# Patient Record
Sex: MALE | Race: WHITE | NOT HISPANIC OR LATINO | Employment: FULL TIME | URBAN - METROPOLITAN AREA
[De-identification: names, ages, dates, MRNs, and addresses within clinical notes are randomized per-mention and may not be internally consistent; named-entity substitution may affect disease eponyms.]

---

## 2018-07-03 RX ORDER — ALBUTEROL SULFATE 90 UG/1
2 AEROSOL, METERED RESPIRATORY (INHALATION) 4 TIMES DAILY
COMMUNITY
Start: 2014-10-14 | End: 2018-07-05

## 2018-07-03 RX ORDER — METHADONE HYDROCHLORIDE 10 MG/ML
INJECTION, SOLUTION INTRAMUSCULAR; INTRAVENOUS; SUBCUTANEOUS
COMMUNITY
End: 2018-07-05

## 2018-07-03 RX ORDER — FLUTICASONE FUROATE AND VILANTEROL 100; 25 UG/1; UG/1
1 POWDER RESPIRATORY (INHALATION) DAILY
COMMUNITY
Start: 2014-10-14 | End: 2018-07-05

## 2018-07-03 RX ORDER — ALPRAZOLAM 0.25 MG/1
1 TABLET ORAL DAILY PRN
COMMUNITY
Start: 2014-12-22 | End: 2018-07-05

## 2018-07-05 ENCOUNTER — OFFICE VISIT (OUTPATIENT)
Dept: FAMILY MEDICINE CLINIC | Facility: CLINIC | Age: 33
End: 2018-07-05
Payer: COMMERCIAL

## 2018-07-05 VITALS
DIASTOLIC BLOOD PRESSURE: 86 MMHG | RESPIRATION RATE: 18 BRPM | SYSTOLIC BLOOD PRESSURE: 124 MMHG | HEIGHT: 71 IN | HEART RATE: 80 BPM | BODY MASS INDEX: 27.58 KG/M2 | TEMPERATURE: 97.9 F | WEIGHT: 197 LBS

## 2018-07-05 DIAGNOSIS — J30.2 SEASONAL ALLERGIC RHINITIS, UNSPECIFIED TRIGGER: ICD-10-CM

## 2018-07-05 DIAGNOSIS — G47.20 SLEEP DISORDER, CIRCADIAN: Primary | ICD-10-CM

## 2018-07-05 DIAGNOSIS — K13.70 ORAL MUCOSAL LESION: ICD-10-CM

## 2018-07-05 DIAGNOSIS — E78.5 DYSLIPIDEMIA: ICD-10-CM

## 2018-07-05 PROCEDURE — 99203 OFFICE O/P NEW LOW 30 MIN: CPT | Performed by: INTERNAL MEDICINE

## 2018-07-05 RX ORDER — CHLORAL HYDRATE 500 MG
1000 CAPSULE ORAL DAILY
COMMUNITY

## 2018-07-05 RX ORDER — CETIRIZINE HYDROCHLORIDE 10 MG/1
10 TABLET ORAL
COMMUNITY

## 2018-07-05 NOTE — PROGRESS NOTES
Assessment/Plan:     Diagnoses and all orders for this visit:    Sleep disorder, circadian  -     Ambulatory referral to Sleep Medicine; Future    Seasonal allergic rhinitis, unspecified trigger    Dyslipidemia  -     Lipid panel  -     CBC and differential  -     Comprehensive metabolic panel  -     TSH, 3rd generation with Free T4 reflex    Oral mucosal lesion  -     Ambulatory referral to Oral Maxillofacial Surgery; Future    Other orders  -     cetirizine (ZyrTEC) 10 mg tablet; Take 10 mg by mouth daily  -     Omega-3 Fatty Acids (FISH OIL) 1,000 mg; Take 1,000 mg by mouth daily      George Arias was seen and examined in the office today  Please see HPI for details  In terms of his sleep disturbance, part of the problem is environmental and he is looking to move  He does report some symptoms that possibly may point to a component of sleep apnea but is unclear at this time  He may have benefit from a sleep evaluation  There is a noted oral lesion of the mucosa noted  This has been present for a number of years but would like this looked at  I have referred him to FS  He was also given blood work to complete  Otherwise no other changes were made    r  Subjective:      Patient ID: Elisa Walsh is a 35 y o  male  George Arias is a pleasant man that is here today to establish care  Medical history was reviewed and updated  He has noticed a few things he wanted to discuss  First, he reports noticing problems with sleep  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 as well which contributes to his problem  He also lives in an apartment where there are loud sounds surrounding him  Frequently he is awoken but this  But he also reports noticing where when he is trying to go to sleep, there are times where he wakes up somewhat gasping for air or feeling like he is choking  This will awaken him  He reports feeling like he snores abnormally  He denies restless leg symptoms   He does not some fatigue secondary to all this  Because of the lack of sleep, at times he feels palpitations and anxiety over this  He has also tried taking Tylenol PM, using earplugs, as well as an eye mask  He also reports having an oral lesion that has been there for a number of years that he would like looked at  Panic Attack   Associated symptoms include fatigue and headaches  Pertinent negatives include no abdominal pain, arthralgias, chest pain, chills, coughing, fever, myalgias, nausea, numbness, sore throat, vomiting or weakness  The following portions of the patient's history were reviewed and updated as appropriate: allergies, current medications, past family history, past medical history, past social history, past surgical history and problem list     Review of Systems   Constitutional: Positive for fatigue  Negative for chills, fever and unexpected weight change  HENT: Positive for sinus pressure  Negative for dental problem, sinus pain, sore throat, trouble swallowing and voice change  Eyes: Negative for visual disturbance  Respiratory: Positive for choking  Negative for cough, chest tightness, shortness of breath and wheezing  Cardiovascular: Positive for palpitations  Negative for chest pain and leg swelling  Gastrointestinal: Negative for abdominal pain, blood in stool, constipation, diarrhea, nausea and vomiting  Genitourinary: Negative for difficulty urinating and dysuria  Musculoskeletal: Negative for arthralgias, back pain and myalgias  Skin: Negative  Neurological: Positive for headaches  Negative for dizziness, syncope, weakness and numbness  Hematological: Negative for adenopathy  Does not bruise/bleed easily  Psychiatric/Behavioral: Positive for sleep disturbance  Negative for dysphoric mood  The patient is nervous/anxious            Objective:      /86   Pulse 80   Temp 97 9 °F (36 6 °C)   Resp 18   Ht 5' 11" (1 803 m)   Wt 89 4 kg (197 lb)   BMI 27 48 kg/m²          Physical Exam   Constitutional: He is oriented to person, place, and time  He appears well-developed and well-nourished  No distress  HENT:   Head: Normocephalic and atraumatic  Right Ear: External ear normal    Left Ear: External ear normal    Mouth/Throat:       Eyes: Conjunctivae and EOM are normal  Right eye exhibits no discharge  Left eye exhibits no discharge  No scleral icterus  Neck: Normal range of motion  No tracheal deviation present  No thyromegaly present  Cardiovascular: Normal rate, regular rhythm and normal heart sounds  No murmur heard  Pulmonary/Chest: Effort normal and breath sounds normal  No respiratory distress  He has no wheezes  He exhibits no tenderness  Abdominal: Soft  Bowel sounds are normal  There is no tenderness  There is no rebound  Musculoskeletal: Normal range of motion  He exhibits no edema  Lymphadenopathy:     He has no cervical adenopathy  Neurological: He is alert and oriented to person, place, and time  He has normal reflexes  No cranial nerve deficit  Skin: Skin is warm and dry  He is not diaphoretic  No erythema  Psychiatric: He has a normal mood and affect  His speech is normal and behavior is normal  Judgment and thought content normal    Vitals reviewed

## 2018-08-27 ENCOUNTER — TRANSCRIBE ORDERS (OUTPATIENT)
Dept: ADMINISTRATIVE | Facility: HOSPITAL | Age: 33
End: 2018-08-27

## 2018-08-27 DIAGNOSIS — D10.39: Primary | ICD-10-CM

## 2018-08-30 ENCOUNTER — OFFICE VISIT (OUTPATIENT)
Dept: SLEEP CENTER | Facility: CLINIC | Age: 33
End: 2018-08-30
Payer: COMMERCIAL

## 2018-08-30 VITALS
HEIGHT: 71 IN | BODY MASS INDEX: 27.41 KG/M2 | SYSTOLIC BLOOD PRESSURE: 120 MMHG | WEIGHT: 195.8 LBS | DIASTOLIC BLOOD PRESSURE: 78 MMHG | HEART RATE: 80 BPM

## 2018-08-30 DIAGNOSIS — G47.20 SLEEP DISORDER, CIRCADIAN: ICD-10-CM

## 2018-08-30 DIAGNOSIS — J30.2 SEASONAL ALLERGIC RHINITIS, UNSPECIFIED TRIGGER: ICD-10-CM

## 2018-08-30 DIAGNOSIS — K13.70 ORAL MUCOSAL LESION: ICD-10-CM

## 2018-08-30 DIAGNOSIS — F51.12 INSUFFICIENT SLEEP SYNDROME: ICD-10-CM

## 2018-08-30 DIAGNOSIS — F41.9 ANXIETY: ICD-10-CM

## 2018-08-30 DIAGNOSIS — G47.33 OSA (OBSTRUCTIVE SLEEP APNEA): Primary | ICD-10-CM

## 2018-08-30 PROCEDURE — 99204 OFFICE O/P NEW MOD 45 MIN: CPT | Performed by: PSYCHIATRY & NEUROLOGY

## 2018-08-30 NOTE — PATIENT INSTRUCTIONS
1   Schedule all night polysomnogram  2  Titrate nasal CPAP if warranted  3  Return for review of test results  4  Determine course of treatment following testing  5    Review results of magnetic resonance imaging testing when available
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
## 2018-09-15 ENCOUNTER — HOSPITAL ENCOUNTER (OUTPATIENT)
Dept: CT IMAGING | Facility: HOSPITAL | Age: 33
Discharge: HOME | End: 2018-09-15
Attending: DENTIST
Payer: COMMERCIAL

## 2018-09-15 DIAGNOSIS — D10.39: ICD-10-CM

## 2018-09-15 PROCEDURE — 70487 CT MAXILLOFACIAL W/DYE: CPT

## 2018-09-15 RX ADMIN — IOHEXOL 85 ML: 350 INJECTION, SOLUTION INTRAVENOUS at 07:40

## 2018-10-29 ENCOUNTER — TELEPHONE (OUTPATIENT)
Dept: SLEEP CENTER | Facility: CLINIC | Age: 33
End: 2018-10-29

## 2018-10-29 DIAGNOSIS — G47.33 OSA (OBSTRUCTIVE SLEEP APNEA): Primary | ICD-10-CM

## 2018-10-29 NOTE — TELEPHONE ENCOUNTER
Pt's insurance ([facility]) denied authorization for Diagnostic Sleep Study ([id number])  Per insurance, not medically necessary  Ref# [id number]  Provider can do a peer to peer by calling [phone] and following the prompts for peer to peer

## 2018-10-30 ENCOUNTER — TRANSCRIBE ORDERS (OUTPATIENT)
Dept: SLEEP CENTER | Facility: CLINIC | Age: 33
End: 2018-10-30

## 2018-10-30 NOTE — TELEPHONE ENCOUNTER
Called pt and l/m to r/c to schedule Home Study  Informed pt that appts were cx until he calls us back

## 2018-12-06 PROCEDURE — 88160 CYTOPATH SMEAR OTHER SOURCE: CPT | Performed by: PATHOLOGY

## 2018-12-06 PROCEDURE — 88305 TISSUE EXAM BY PATHOLOGIST: CPT | Performed by: PATHOLOGY

## 2018-12-06 PROCEDURE — 88112 CYTOPATH CELL ENHANCE TECH: CPT | Performed by: PATHOLOGY

## 2018-12-07 ENCOUNTER — LAB REQUISITION (OUTPATIENT)
Dept: LAB | Facility: HOSPITAL | Age: 33
End: 2018-12-07
Payer: COMMERCIAL

## 2018-12-07 DIAGNOSIS — R22.0 LOCALIZED SWELLING, MASS, AND LUMP OF HEAD: ICD-10-CM

## 2019-05-06 PROBLEM — M85.88 MASS OF HARD PALATE: Status: ACTIVE | Noted: 2019-05-06

## 2019-10-11 ENCOUNTER — OFFICE VISIT (OUTPATIENT)
Dept: FAMILY MEDICINE CLINIC | Facility: CLINIC | Age: 34
End: 2019-10-11
Payer: COMMERCIAL

## 2019-10-11 VITALS
RESPIRATION RATE: 18 BRPM | DIASTOLIC BLOOD PRESSURE: 70 MMHG | WEIGHT: 202 LBS | TEMPERATURE: 98.2 F | BODY MASS INDEX: 27.36 KG/M2 | HEART RATE: 84 BPM | HEIGHT: 72 IN | SYSTOLIC BLOOD PRESSURE: 114 MMHG | OXYGEN SATURATION: 96 %

## 2019-10-11 DIAGNOSIS — G47.20 SLEEP DISORDER, CIRCADIAN: Primary | ICD-10-CM

## 2019-10-11 DIAGNOSIS — E66.3 OVERWEIGHT (BMI 25.0-29.9): ICD-10-CM

## 2019-10-11 DIAGNOSIS — F41.9 ANXIETY: ICD-10-CM

## 2019-10-11 DIAGNOSIS — E78.5 DYSLIPIDEMIA: ICD-10-CM

## 2019-10-11 PROCEDURE — 99215 OFFICE O/P EST HI 40 MIN: CPT | Performed by: PHYSICIAN ASSISTANT

## 2019-10-11 RX ORDER — TRAZODONE HYDROCHLORIDE 50 MG/1
TABLET ORAL
COMMUNITY
Start: 2019-10-07 | End: 2019-10-15 | Stop reason: SINTOL

## 2019-10-11 RX ORDER — DOXEPIN HYDROCHLORIDE 25 MG/1
CAPSULE ORAL
COMMUNITY
Start: 2019-10-07 | End: 2019-10-15 | Stop reason: SINTOL

## 2019-10-11 NOTE — PROGRESS NOTES
Assessment and Plan:  Patient Instructions   1  Sleep disorder-patient with some insomnia, secondary to anxiety and distraction at home  Would recommend that he 1st try the trazodone at bedtime as necessary  Patient was getting some relief with doxepin however he states he does not want to be on a medication on a daily basis  Thus, we will try trazodone as needed  Recommend follow-up in 2-4 weeks if symptoms are not improving and consider daily medication  2  Anxiety-treatment discussed as above  Will assess labs including thyroid, CBC, CMP  3  Hyperlipidemia-cholesterol labs from this summer were reviewed  Continue with healthy diet and exercise  4  Overweight-continue with diet and exercise efforts  Diagnoses and all orders for this visit:    Sleep disorder, circadian  -     CBC and differential  -     Comprehensive metabolic panel  -     TSH, 3rd generation with Free T4 reflex    Anxiety  -     CBC and differential  -     Comprehensive metabolic panel  -     TSH, 3rd generation with Free T4 reflex    Dyslipidemia    Overweight (BMI 25 0-29  9)    Other orders  -     doxepin (SINEquan) 25 mg capsule  -     traZODone (DESYREL) 50 mg tablet              Subjective:      Patient ID: Tia Whitney is a 29 y o  male  CC:    Chief Complaint   Patient presents with    Anxiety     pt was having anxiety this past week and went to urgent care and they told him to follow up with a PCP  He is here to address this issue and establish a PCP    Insomnia       HPI:    HPI:  This is a 79-year-old gentleman that presents to the office as a new patient  He is here to get established  He was recently seen in the urgent care setting for anxiety and trouble sleeping  He feels that most of his anxiety is related to his difficulty sleeping 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  He has been trying to sleep from 9:00 a m  Until later in the afternoon    He has been having some distraction at home with his neighbors getting new roofs on their house that he has had difficulty sleeping  He has also had some financial difficulties with his wife being injured and unable to work recently  At the urgent care setting he was given doxepin which seemed to help him with sleep however he was using it as needed because he does not want to take something every day  He did not yet tried trazodone which was prescribed either  He does occasionally have symptoms of panic attacks where he will get a sudden sense of anxiety and feel that his heart is racing  He was getting these every day but they seem less frequent recently  The following portions of the patient's history were reviewed and updated as appropriate: allergies, current medications, past family history, past medical history, past social history, past surgical history and problem list       Review of Systems   Constitutional: Negative for chills, fatigue and fever  HENT: Negative for congestion, ear pain and sinus pressure  Eyes: Negative for visual disturbance  Respiratory: Negative for cough, chest tightness and shortness of breath  Cardiovascular: Negative for chest pain and palpitations  Gastrointestinal: Negative for diarrhea, nausea and vomiting  Endocrine: Negative for polyuria  Genitourinary: Negative for dysuria and frequency  Musculoskeletal: Negative for arthralgias and myalgias  Skin: Negative for pallor and rash  Neurological: Negative for dizziness, weakness, light-headedness, numbness and headaches  Psychiatric/Behavioral: Positive for sleep disturbance  Negative for agitation and behavioral problems  The patient is nervous/anxious  All other systems reviewed and are negative          Data to review:       Objective:    Vitals:    10/11/19 1448   BP: 114/70   BP Location: Left arm   Patient Position: Sitting   Cuff Size: Adult   Pulse: 84   Resp: 18   Temp: 98 2 °F (36 8 °C)   TempSrc: Temporal   SpO2: 96%   Weight: 91 6 kg (202 lb)   Height: 6' (1 829 m)        Physical Exam   Constitutional: He is oriented to person, place, and time  He appears well-developed and well-nourished  No distress  HENT:   Head: Normocephalic and atraumatic  Right Ear: External ear normal    Left Ear: External ear normal    Nose: Nose normal    Mouth/Throat: Oropharynx is clear and moist  No oropharyngeal exudate  Eyes: Pupils are equal, round, and reactive to light  Conjunctivae and EOM are normal    Neck: Normal range of motion  Neck supple  No tracheal deviation present  No thyromegaly present  Cardiovascular: Normal rate, regular rhythm and normal heart sounds  Exam reveals no friction rub  No murmur heard  Pulmonary/Chest: Effort normal and breath sounds normal  No respiratory distress  He has no wheezes  He has no rales  Abdominal: Soft  Bowel sounds are normal  He exhibits no distension  There is no tenderness  There is no rebound and no guarding  Musculoskeletal: Normal range of motion  He exhibits no edema or tenderness  Lymphadenopathy:     He has no cervical adenopathy  Neurological: He is alert and oriented to person, place, and time  No cranial nerve deficit  Coordination normal    Skin: Skin is warm and dry  No rash noted  No erythema  Psychiatric: He has a normal mood and affect  His behavior is normal  Thought content normal    Nursing note and vitals reviewed  BMI Counseling: Body mass index is 27 4 kg/m²  The BMI is above normal  Nutrition recommendations include reducing portion sizes

## 2019-10-11 NOTE — PATIENT INSTRUCTIONS
1  Sleep disorder-patient with some insomnia, secondary to anxiety and distraction at home  Would recommend that he 1st try the trazodone at bedtime as necessary  Patient was getting some relief with doxepin however he states he does not want to be on a medication on a daily basis  Thus, we will try trazodone as needed  Recommend follow-up in 2-4 weeks if symptoms are not improving and consider daily medication  2  Anxiety-treatment discussed as above  Will assess labs including thyroid, CBC, CMP  3  Hyperlipidemia-cholesterol labs from this summer were reviewed  Continue with healthy diet and exercise  4  Overweight-continue with diet and exercise efforts

## 2019-10-14 ENCOUNTER — TELEPHONE (OUTPATIENT)
Dept: FAMILY MEDICINE CLINIC | Facility: CLINIC | Age: 34
End: 2019-10-14

## 2019-10-14 DIAGNOSIS — F41.9 ANXIETY: Primary | ICD-10-CM

## 2019-10-14 NOTE — TELEPHONE ENCOUNTER
Patient calling to request to speak to a provider, the medication he was prescribed last week is not working and he thinks he is experiencing side effects from the Trazodone and the Doxepin   Can someone please call him [phone]

## 2019-10-15 RX ORDER — HYDROXYZINE 50 MG/1
50 TABLET, FILM COATED ORAL
Qty: 30 TABLET | Refills: 0 | Status: SHIPPED | OUTPATIENT
Start: 2019-10-15 | End: 2019-10-22 | Stop reason: ALTCHOICE

## 2019-10-15 NOTE — TELEPHONE ENCOUNTER
He may discontinue the trazodone and doxepin  Would recommend trial of hydroxyzine 50 mg at bedtime  I will send a prescription for this to his pharmacy

## 2019-10-15 NOTE — TELEPHONE ENCOUNTER
Alprazolam is a controlled substance with addictive potential   I would not recommended  I would recommend the hydroxyzine  Or we could set up a consult with Psychiatry if he would prefer

## 2019-10-15 NOTE — TELEPHONE ENCOUNTER
Pt states he use to take Alprazolam in the past and it seemed to work for him, pt asking if he can get a script for that sent to [facility]

## 2019-10-22 ENCOUNTER — OFFICE VISIT (OUTPATIENT)
Dept: FAMILY MEDICINE CLINIC | Facility: CLINIC | Age: 34
End: 2019-10-22
Payer: COMMERCIAL

## 2019-10-22 VITALS
HEIGHT: 72 IN | WEIGHT: 200 LBS | SYSTOLIC BLOOD PRESSURE: 114 MMHG | DIASTOLIC BLOOD PRESSURE: 74 MMHG | BODY MASS INDEX: 27.09 KG/M2 | HEART RATE: 76 BPM

## 2019-10-22 DIAGNOSIS — F41.9 ANXIETY: Primary | ICD-10-CM

## 2019-10-22 DIAGNOSIS — J30.2 SEASONAL ALLERGIES: ICD-10-CM

## 2019-10-22 DIAGNOSIS — G47.09 OTHER INSOMNIA: ICD-10-CM

## 2019-10-22 PROCEDURE — 3008F BODY MASS INDEX DOCD: CPT | Performed by: PHYSICIAN ASSISTANT

## 2019-10-22 PROCEDURE — 99214 OFFICE O/P EST MOD 30 MIN: CPT | Performed by: PHYSICIAN ASSISTANT

## 2019-10-22 RX ORDER — BUPROPION HYDROCHLORIDE 150 MG/1
150 TABLET ORAL EVERY MORNING
Qty: 30 TABLET | Refills: 5 | Status: SHIPPED | OUTPATIENT
Start: 2019-10-22 | End: 2019-12-06

## 2019-10-22 RX ORDER — ALPRAZOLAM 0.25 MG/1
0.25 TABLET ORAL
Qty: 30 TABLET | Refills: 0 | Status: SHIPPED | OUTPATIENT
Start: 2019-10-22 | End: 2020-06-13 | Stop reason: ALTCHOICE

## 2019-10-22 NOTE — PATIENT INSTRUCTIONS
1   Anxiety -not at goal   Would recommend something that patient can sake safely daily  He has failed on doxepin, trazodone, hydroxyzine  Will try Wellbutrin  mg daily  Reassess in 4 weeks as necessary  2   Insomnia -persistent -will prescribe   Xanax 0 25 mg at bedtime as necessary  Possible side effects and appropriate use of medication discussed with patient  He was given a prescription for 30 tablets with no refills and advised that this should last over a month and hopefully be enough to get through his episode  If he requires refills consider further evaluation by specialist   3   Allergic rhinitis - stable with Zyrtec as necessary

## 2019-10-22 NOTE — PROGRESS NOTES
Assessment and Plan:  Patient Instructions     1  Anxiety -not at goal   Would recommend something that patient can sake safely daily  He has failed on doxepin, trazodone, hydroxyzine  Will try Wellbutrin  mg daily  Reassess in 4 weeks as necessary  2   Insomnia -persistent -will prescribe   Xanax 0 25 mg at bedtime as necessary  Possible side effects and appropriate use of medication discussed with patient  He was given a prescription for 30 tablets with no refills and advised that this should last over a month and hopefully be enough to get through his episode  If he requires refills consider further evaluation by specialist   3   Allergic rhinitis - stable with Zyrtec as necessary  Diagnoses and all orders for this visit:    Anxiety  -     buPROPion (WELLBUTRIN XL) 150 mg 24 hr tablet; Take 1 tablet (150 mg total) by mouth every morning  -     ALPRAZolam (XANAX) 0 25 mg tablet; Take 1 tablet (0 25 mg total) by mouth daily at bedtime as needed for anxiety    Other insomnia  -     buPROPion (WELLBUTRIN XL) 150 mg 24 hr tablet; Take 1 tablet (150 mg total) by mouth every morning  -     ALPRAZolam (XANAX) 0 25 mg tablet; Take 1 tablet (0 25 mg total) by mouth daily at bedtime as needed for anxiety    Seasonal allergies              Subjective:      Patient ID: Rani Au is a 29 y o  male  CC:    Chief Complaint   Patient presents with    Stress    Anxiety    Sleeping Problem       HPI:     HPI:  This is a 72-year-old gentleman that presents to the office for follow-up of anxiety and insomnia  He was initially seen in the urgent care and started on doxepin and trazodone  He had some side effect of priapism after using the trazodone and grew concerned  He has been under lot of stress between 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  He has had to drop class because of difficulty with the workload and keeping up    He was also given hydroxyzine to help him sleep but has been having difficulty despite using that  He is sleeping during the daytime and working primarily at nighttime  He has had difficulty sleeping during the daytime while his neighbors roofs are being fixed and there is constant hammering  The following portions of the patient's history were reviewed and updated as appropriate: allergies, current medications, past family history, past medical history, past social history, past surgical history and problem list       Review of Systems   Constitutional: Negative for fever  HENT: Negative for congestion  Respiratory: Negative for cough, chest tightness and shortness of breath  Cardiovascular: Negative for chest pain  Musculoskeletal: Negative for arthralgias  Psychiatric/Behavioral: Positive for sleep disturbance  The patient is nervous/anxious  Data to review:       Objective:    Vitals:    10/22/19 0807   BP: 114/74   Pulse: 76   Weight: 90 7 kg (200 lb)   Height: 6' (1 829 m)        Physical Exam   Constitutional: He is oriented to person, place, and time  He appears well-developed and well-nourished  HENT:   Head: Normocephalic  Cardiovascular: Normal rate and regular rhythm  Pulmonary/Chest: Effort normal and breath sounds normal  No respiratory distress  Abdominal: Soft  Musculoskeletal: Normal range of motion  Neurological: He is alert and oriented to person, place, and time  Psychiatric: He has a normal mood and affect

## 2019-11-10 DIAGNOSIS — F41.9 ANXIETY: ICD-10-CM

## 2019-11-11 RX ORDER — HYDROXYZINE 50 MG/1
50 TABLET, FILM COATED ORAL
Qty: 30 TABLET | Refills: 0 | Status: SHIPPED | OUTPATIENT
Start: 2019-11-11 | End: 2020-06-13 | Stop reason: ALTCHOICE

## 2019-12-06 ENCOUNTER — OFFICE VISIT (OUTPATIENT)
Dept: FAMILY MEDICINE CLINIC | Facility: CLINIC | Age: 34
End: 2019-12-06
Payer: COMMERCIAL

## 2019-12-06 VITALS
DIASTOLIC BLOOD PRESSURE: 70 MMHG | WEIGHT: 201 LBS | HEART RATE: 76 BPM | HEIGHT: 72 IN | SYSTOLIC BLOOD PRESSURE: 110 MMHG | BODY MASS INDEX: 27.22 KG/M2

## 2019-12-06 DIAGNOSIS — F41.9 ANXIETY: Primary | ICD-10-CM

## 2019-12-06 PROCEDURE — 1036F TOBACCO NON-USER: CPT | Performed by: FAMILY MEDICINE

## 2019-12-06 PROCEDURE — 3008F BODY MASS INDEX DOCD: CPT | Performed by: FAMILY MEDICINE

## 2019-12-06 PROCEDURE — 99213 OFFICE O/P EST LOW 20 MIN: CPT | Performed by: FAMILY MEDICINE

## 2019-12-06 RX ORDER — CITALOPRAM 10 MG/1
10 TABLET ORAL DAILY
Qty: 30 TABLET | Refills: 3 | Status: SHIPPED | OUTPATIENT
Start: 2019-12-06 | End: 2020-06-13 | Stop reason: ALTCHOICE

## 2019-12-06 NOTE — PROGRESS NOTES
Assessment and Plan:  Follow-up 6 weeks to check progress    Problem List Items Addressed This Visit        Other    Anxiety - Primary      Wellbutrin was discontinued and citalopram was started in its place  I discouraged him from using the Xanax which she wanted to use on a regular basis  Relevant Medications    citalopram (CeleXA) 10 mg tablet                 Diagnoses and all orders for this visit:    Anxiety  -     citalopram (CeleXA) 10 mg tablet; Take 1 tablet (10 mg total) by mouth daily              Subjective:      Patient ID: Linda Mahajan is a 29 y o  male  CC:    Chief Complaint   Patient presents with    Anxiety     follow up anxiety - evaluate medication started at last   - [facility]       HPI:     This is a 20-year-old male who comes in for recheck of his anxiety  He was unable to take the Wellbutrin because it gave him periods of severe depression  He stopped taking the medication and is requesting another medication for the anxiety  He denies any depression  Citalopram will be started  He was wondering if he could just take the Xanax and it was discussed regarding the addictive feature of the drug and therefore citalopram will be used  His blood pressure is 110/70 and his weight is up 1 lb from the previous visit to what 201 lb and his BMI is 27 2  The following portions of the patient's history were reviewed and updated as appropriate: allergies, current medications, past family history, past medical history, past social history, past surgical history and problem list       Review of Systems   Constitutional: Negative  HENT: Negative  Eyes: Negative  Respiratory: Negative  Cardiovascular: Negative  Gastrointestinal: Negative  Endocrine: Negative  Genitourinary: Negative  Musculoskeletal: Negative  Skin: Negative  Allergic/Immunologic: Negative  Neurological: Negative  Hematological: Negative  Psychiatric/Behavioral: Negative  Data to review:       Objective:    Vitals:    12/06/19 0819   BP: 110/70   BP Location: Left arm   Patient Position: Sitting   Cuff Size: Large   Pulse: 76   Weight: 91 2 kg (201 lb)   Height: 6' (1 829 m)        Physical Exam   Constitutional: He is oriented to person, place, and time  He appears well-developed and well-nourished  HENT:   Head: Normocephalic  Right Ear: External ear normal    Left Ear: External ear normal    Mouth/Throat: Oropharynx is clear and moist    Eyes: Pupils are equal, round, and reactive to light  Conjunctivae and EOM are normal    Neck: Normal range of motion  Neck supple  Cardiovascular: Normal rate, regular rhythm and normal heart sounds  Pulmonary/Chest: Effort normal and breath sounds normal    Abdominal: Soft  Bowel sounds are normal    Musculoskeletal: Normal range of motion  Neurological: He is alert and oriented to person, place, and time  Skin: Skin is warm  Psychiatric: He has a normal mood and affect  His behavior is normal  Judgment and thought content normal    Nursing note and vitals reviewed  BMI Counseling: Body mass index is 27 26 kg/m²  The BMI is above normal  Nutrition recommendations include reducing portion sizes, decreasing overall calorie intake, 3-5 servings of fruits/vegetables daily, reducing fast food intake, consuming healthier snacks, decreasing soda and/or juice intake, moderation in carbohydrate intake, increasing intake of lean protein, reducing intake of saturated fat and trans fat and reducing intake of cholesterol  Exercise recommendations include moderate aerobic physical activity for 150 minutes/week

## 2019-12-06 NOTE — ASSESSMENT & PLAN NOTE
Wellbutrin was discontinued and citalopram was started in its place  I discouraged him from using the Xanax which she wanted to use on a regular basis
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
## 2020-06-05 ENCOUNTER — HOSPITAL ENCOUNTER (OUTPATIENT)
Dept: RADIOLOGY | Facility: HOSPITAL | Age: 35
Discharge: HOME | End: 2020-06-05
Payer: COMMERCIAL

## 2020-06-05 DIAGNOSIS — M85.88 MASS OF HARD PALATE: ICD-10-CM

## 2020-06-05 PROCEDURE — 70486 CT MAXILLOFACIAL W/O DYE: CPT

## 2020-06-12 ENCOUNTER — APPOINTMENT (OUTPATIENT)
Dept: LAB | Facility: MEDICAL CENTER | Age: 35
End: 2020-06-12
Payer: COMMERCIAL

## 2020-06-12 DIAGNOSIS — D37.09: ICD-10-CM

## 2020-06-12 LAB
ANION GAP SERPL CALCULATED.3IONS-SCNC: 6 MMOL/L (ref 4–13)
BASOPHILS # BLD AUTO: 0.05 THOUSANDS/ΜL (ref 0–0.1)
BASOPHILS NFR BLD AUTO: 1 % (ref 0–1)
BUN SERPL-MCNC: 13 MG/DL (ref 5–25)
CALCIUM SERPL-MCNC: 8.4 MG/DL (ref 8.3–10.1)
CHLORIDE SERPL-SCNC: 106 MMOL/L (ref 100–108)
CO2 SERPL-SCNC: 25 MMOL/L (ref 21–32)
CREAT SERPL-MCNC: 0.96 MG/DL (ref 0.6–1.3)
EOSINOPHIL # BLD AUTO: 0.22 THOUSAND/ΜL (ref 0–0.61)
EOSINOPHIL NFR BLD AUTO: 4 % (ref 0–6)
ERYTHROCYTE [DISTWIDTH] IN BLOOD BY AUTOMATED COUNT: 11.9 % (ref 11.6–15.1)
GFR SERPL CREATININE-BSD FRML MDRD: 102 ML/MIN/1.73SQ M
GLUCOSE SERPL-MCNC: 94 MG/DL (ref 65–140)
HCT VFR BLD AUTO: 47 % (ref 36.5–49.3)
HGB BLD-MCNC: 16.1 G/DL (ref 12–17)
IMM GRANULOCYTES # BLD AUTO: 0.02 THOUSAND/UL (ref 0–0.2)
IMM GRANULOCYTES NFR BLD AUTO: 0 % (ref 0–2)
LYMPHOCYTES # BLD AUTO: 1.67 THOUSANDS/ΜL (ref 0.6–4.47)
LYMPHOCYTES NFR BLD AUTO: 27 % (ref 14–44)
MCH RBC QN AUTO: 30.7 PG (ref 26.8–34.3)
MCHC RBC AUTO-ENTMCNC: 34.3 G/DL (ref 31.4–37.4)
MCV RBC AUTO: 90 FL (ref 82–98)
MONOCYTES # BLD AUTO: 0.38 THOUSAND/ΜL (ref 0.17–1.22)
MONOCYTES NFR BLD AUTO: 6 % (ref 4–12)
NEUTROPHILS # BLD AUTO: 3.77 THOUSANDS/ΜL (ref 1.85–7.62)
NEUTS SEG NFR BLD AUTO: 62 % (ref 43–75)
NRBC BLD AUTO-RTO: 0 /100 WBCS
PLATELET # BLD AUTO: 230 THOUSANDS/UL (ref 149–390)
PMV BLD AUTO: 11.3 FL (ref 8.9–12.7)
POTASSIUM SERPL-SCNC: 4.1 MMOL/L (ref 3.5–5.3)
RBC # BLD AUTO: 5.24 MILLION/UL (ref 3.88–5.62)
SODIUM SERPL-SCNC: 137 MMOL/L (ref 136–145)
WBC # BLD AUTO: 6.11 THOUSAND/UL (ref 4.31–10.16)

## 2020-06-12 PROCEDURE — 85025 COMPLETE CBC W/AUTO DIFF WBC: CPT

## 2020-06-12 PROCEDURE — 36415 COLL VENOUS BLD VENIPUNCTURE: CPT

## 2020-06-12 PROCEDURE — 80048 BASIC METABOLIC PNL TOTAL CA: CPT

## 2020-06-12 PROCEDURE — U0003 INFECTIOUS AGENT DETECTION BY NUCLEIC ACID (DNA OR RNA); SEVERE ACUTE RESPIRATORY SYNDROME CORONAVIRUS 2 (SARS-COV-2) (CORONAVIRUS DISEASE [COVID-19]), AMPLIFIED PROBE TECHNIQUE, MAKING USE OF HIGH THROUGHPUT TECHNOLOGIES AS DESCRIBED BY CMS-2020-01-R: HCPCS

## 2020-06-13 ENCOUNTER — OFFICE VISIT (OUTPATIENT)
Dept: FAMILY MEDICINE CLINIC | Facility: CLINIC | Age: 35
End: 2020-06-13
Payer: COMMERCIAL

## 2020-06-13 VITALS
BODY MASS INDEX: 28.01 KG/M2 | SYSTOLIC BLOOD PRESSURE: 122 MMHG | TEMPERATURE: 97.7 F | WEIGHT: 206.8 LBS | HEIGHT: 72 IN | HEART RATE: 88 BPM | RESPIRATION RATE: 18 BRPM | DIASTOLIC BLOOD PRESSURE: 88 MMHG

## 2020-06-13 DIAGNOSIS — M54.50 ACUTE MIDLINE LOW BACK PAIN WITHOUT SCIATICA: Primary | ICD-10-CM

## 2020-06-13 LAB — SARS-COV-2 RNA SPEC QL NAA+PROBE: NOT DETECTED

## 2020-06-13 PROCEDURE — 3008F BODY MASS INDEX DOCD: CPT | Performed by: FAMILY MEDICINE

## 2020-06-13 PROCEDURE — 99213 OFFICE O/P EST LOW 20 MIN: CPT | Performed by: FAMILY MEDICINE

## 2020-06-13 PROCEDURE — 1036F TOBACCO NON-USER: CPT | Performed by: FAMILY MEDICINE

## 2020-06-13 RX ORDER — METHOCARBAMOL 750 MG/1
750 TABLET, FILM COATED ORAL 4 TIMES DAILY PRN
Qty: 30 TABLET | Refills: 0 | Status: SHIPPED | OUTPATIENT
Start: 2020-06-13 | End: 2020-07-21

## 2020-06-13 RX ORDER — NAPROXEN 500 MG/1
500 TABLET ORAL EVERY 12 HOURS PRN
Status: ON HOLD | COMMUNITY
Start: 2020-06-10 | End: 2020-07-29 | Stop reason: ALTCHOICE

## 2020-06-13 RX ORDER — METHOCARBAMOL 750 MG/1
750 TABLET, FILM COATED ORAL 4 TIMES DAILY PRN
COMMUNITY
Start: 2020-06-10 | End: 2020-06-13 | Stop reason: SDUPTHER

## 2020-06-13 RX ORDER — DIAZEPAM 5 MG/1
5 TABLET ORAL EVERY 8 HOURS PRN
Status: ON HOLD | COMMUNITY
Start: 2020-06-10 | End: 2020-07-29 | Stop reason: ALTCHOICE

## 2020-06-13 RX ORDER — MOMETASONE FUROATE 50 UG/1
2 SPRAY, METERED NASAL
COMMUNITY
End: 2022-03-21

## 2020-06-17 NOTE — PRE-PROCEDURE INSTRUCTIONS
Pre-Surgery Instructions:   Medication Instructions    cetirizine (ZyrTEC) 10 mg tablet Instructed patient per Anesthesia Guidelines   diazepam (VALIUM) 5 mg tablet Instructed patient per Anesthesia Guidelines   methocarbamol (ROBAXIN) 750 mg tablet Instructed patient per Anesthesia Guidelines   mometasone (NASONEX) 50 mcg/act nasal spray Instructed patient per Anesthesia Guidelines   naproxen (NAPROSYN) 500 mg tablet Instructed patient per Anesthesia Guidelines   Omega-3 Fatty Acids (FISH OIL) 1,000 mg Instructed patient per Anesthesia Guidelines  Preop,medications and showering instructions using an antibacterial soap reviewed

## 2020-06-18 ENCOUNTER — ANESTHESIA EVENT (OUTPATIENT)
Dept: PERIOP | Facility: HOSPITAL | Age: 35
End: 2020-06-18
Payer: COMMERCIAL

## 2020-06-19 ENCOUNTER — ANESTHESIA (OUTPATIENT)
Dept: PERIOP | Facility: HOSPITAL | Age: 35
End: 2020-06-19
Payer: COMMERCIAL

## 2020-06-19 ENCOUNTER — HOSPITAL ENCOUNTER (OUTPATIENT)
Facility: HOSPITAL | Age: 35
Setting detail: OUTPATIENT SURGERY
Discharge: HOME | End: 2020-06-19
Attending: SPECIALIST | Admitting: SPECIALIST
Payer: COMMERCIAL

## 2020-06-19 VITALS
BODY MASS INDEX: 27.9 KG/M2 | SYSTOLIC BLOOD PRESSURE: 131 MMHG | HEIGHT: 72 IN | TEMPERATURE: 97.2 F | DIASTOLIC BLOOD PRESSURE: 75 MMHG | WEIGHT: 206 LBS | OXYGEN SATURATION: 95 % | HEART RATE: 79 BPM | RESPIRATION RATE: 15 BRPM

## 2020-06-19 DIAGNOSIS — D37.09: ICD-10-CM

## 2020-06-19 DIAGNOSIS — M85.88 MASS OF HARD PALATE: Primary | ICD-10-CM

## 2020-06-19 PROCEDURE — 88342 IMHCHEM/IMCYTCHM 1ST ANTB: CPT | Performed by: PATHOLOGY

## 2020-06-19 PROCEDURE — 88305 TISSUE EXAM BY PATHOLOGIST: CPT | Performed by: PATHOLOGY

## 2020-06-19 PROCEDURE — 88311 DECALCIFY TISSUE: CPT | Performed by: PATHOLOGY

## 2020-06-19 PROCEDURE — 88341 IMHCHEM/IMCYTCHM EA ADD ANTB: CPT | Performed by: PATHOLOGY

## 2020-06-19 PROCEDURE — 42106 EXCISION LESION MOUTH ROOF: CPT | Performed by: SPECIALIST

## 2020-06-19 PROCEDURE — 88313 SPECIAL STAINS GROUP 2: CPT

## 2020-06-19 PROCEDURE — 88360 TUMOR IMMUNOHISTOCHEM/MANUAL: CPT

## 2020-06-19 PROCEDURE — 88342 IMHCHEM/IMCYTCHM 1ST ANTB: CPT

## 2020-06-19 PROCEDURE — 88341 IMHCHEM/IMCYTCHM EA ADD ANTB: CPT

## 2020-06-19 RX ORDER — LIDOCAINE HYDROCHLORIDE 10 MG/ML
INJECTION, SOLUTION EPIDURAL; INFILTRATION; INTRACAUDAL; PERINEURAL AS NEEDED
Status: DISCONTINUED | OUTPATIENT
Start: 2020-06-19 | End: 2020-06-19 | Stop reason: SURG

## 2020-06-19 RX ORDER — PROPOFOL 10 MG/ML
INJECTION, EMULSION INTRAVENOUS AS NEEDED
Status: DISCONTINUED | OUTPATIENT
Start: 2020-06-19 | End: 2020-06-19 | Stop reason: SURG

## 2020-06-19 RX ORDER — FENTANYL CITRATE/PF 50 MCG/ML
50 SYRINGE (ML) INJECTION
Status: DISCONTINUED | OUTPATIENT
Start: 2020-06-19 | End: 2020-06-19 | Stop reason: HOSPADM

## 2020-06-19 RX ORDER — MIDAZOLAM HYDROCHLORIDE 2 MG/2ML
INJECTION, SOLUTION INTRAMUSCULAR; INTRAVENOUS AS NEEDED
Status: DISCONTINUED | OUTPATIENT
Start: 2020-06-19 | End: 2020-06-19 | Stop reason: SURG

## 2020-06-19 RX ORDER — ONDANSETRON 2 MG/ML
INJECTION INTRAMUSCULAR; INTRAVENOUS AS NEEDED
Status: DISCONTINUED | OUTPATIENT
Start: 2020-06-19 | End: 2020-06-19 | Stop reason: SURG

## 2020-06-19 RX ORDER — ONDANSETRON 2 MG/ML
4 INJECTION INTRAMUSCULAR; INTRAVENOUS ONCE AS NEEDED
Status: DISCONTINUED | OUTPATIENT
Start: 2020-06-19 | End: 2020-06-19 | Stop reason: HOSPADM

## 2020-06-19 RX ORDER — LIDOCAINE HYDROCHLORIDE AND EPINEPHRINE 10; 10 MG/ML; UG/ML
INJECTION, SOLUTION INFILTRATION; PERINEURAL AS NEEDED
Status: DISCONTINUED | OUTPATIENT
Start: 2020-06-19 | End: 2020-06-19 | Stop reason: HOSPADM

## 2020-06-19 RX ORDER — ACETAMINOPHEN 325 MG/1
TABLET ORAL
Qty: 30 TABLET | Refills: 0
Start: 2020-06-19 | End: 2022-03-21

## 2020-06-19 RX ORDER — FENTANYL CITRATE 50 UG/ML
INJECTION, SOLUTION INTRAMUSCULAR; INTRAVENOUS AS NEEDED
Status: DISCONTINUED | OUTPATIENT
Start: 2020-06-19 | End: 2020-06-19 | Stop reason: SURG

## 2020-06-19 RX ORDER — ACETAMINOPHEN 325 MG/1
650 TABLET ORAL EVERY 6 HOURS PRN
Status: DISCONTINUED | OUTPATIENT
Start: 2020-06-19 | End: 2020-06-19 | Stop reason: HOSPADM

## 2020-06-19 RX ORDER — DEXAMETHASONE SODIUM PHOSPHATE 10 MG/ML
INJECTION, SOLUTION INTRAMUSCULAR; INTRAVENOUS AS NEEDED
Status: DISCONTINUED | OUTPATIENT
Start: 2020-06-19 | End: 2020-06-19 | Stop reason: SURG

## 2020-06-19 RX ORDER — ROCURONIUM BROMIDE 10 MG/ML
INJECTION, SOLUTION INTRAVENOUS AS NEEDED
Status: DISCONTINUED | OUTPATIENT
Start: 2020-06-19 | End: 2020-06-19 | Stop reason: SURG

## 2020-06-19 RX ORDER — SODIUM CHLORIDE, SODIUM LACTATE, POTASSIUM CHLORIDE, CALCIUM CHLORIDE 600; 310; 30; 20 MG/100ML; MG/100ML; MG/100ML; MG/100ML
125 INJECTION, SOLUTION INTRAVENOUS CONTINUOUS
Status: DISCONTINUED | OUTPATIENT
Start: 2020-06-19 | End: 2020-06-19 | Stop reason: HOSPADM

## 2020-06-19 RX ORDER — SODIUM CHLORIDE, SODIUM LACTATE, POTASSIUM CHLORIDE, CALCIUM CHLORIDE 600; 310; 30; 20 MG/100ML; MG/100ML; MG/100ML; MG/100ML
125 INJECTION, SOLUTION INTRAVENOUS CONTINUOUS
Status: DISCONTINUED | OUTPATIENT
Start: 2020-06-19 | End: 2020-06-19 | Stop reason: SDUPTHER

## 2020-06-19 RX ORDER — SUCCINYLCHOLINE/SOD CL,ISO/PF 100 MG/5ML
SYRINGE (ML) INTRAVENOUS AS NEEDED
Status: DISCONTINUED | OUTPATIENT
Start: 2020-06-19 | End: 2020-06-19 | Stop reason: SURG

## 2020-06-19 RX ORDER — HYDROMORPHONE HCL/PF 1 MG/ML
SYRINGE (ML) INJECTION AS NEEDED
Status: DISCONTINUED | OUTPATIENT
Start: 2020-06-19 | End: 2020-06-19 | Stop reason: SURG

## 2020-06-19 RX ADMIN — DEXAMETHASONE SODIUM PHOSPHATE 10 MG: 10 INJECTION, SOLUTION INTRAMUSCULAR; INTRAVENOUS at 10:50

## 2020-06-19 RX ADMIN — FENTANYL CITRATE 50 MCG: 50 INJECTION INTRAMUSCULAR; INTRAVENOUS at 11:36

## 2020-06-19 RX ADMIN — ONDANSETRON 4 MG: 2 INJECTION INTRAMUSCULAR; INTRAVENOUS at 10:50

## 2020-06-19 RX ADMIN — LIDOCAINE HYDROCHLORIDE 50 MG: 10 INJECTION, SOLUTION EPIDURAL; INFILTRATION; INTRACAUDAL; PERINEURAL at 10:42

## 2020-06-19 RX ADMIN — FENTANYL CITRATE 50 MCG: 50 INJECTION INTRAMUSCULAR; INTRAVENOUS at 11:30

## 2020-06-19 RX ADMIN — ROCURONIUM BROMIDE 10 MG: 10 SOLUTION INTRAVENOUS at 10:42

## 2020-06-19 RX ADMIN — FENTANYL CITRATE 50 MCG: 50 INJECTION INTRAMUSCULAR; INTRAVENOUS at 10:42

## 2020-06-19 RX ADMIN — Medication 100 MG: at 10:42

## 2020-06-19 RX ADMIN — PROPOFOL 60 MG: 10 INJECTION, EMULSION INTRAVENOUS at 10:47

## 2020-06-19 RX ADMIN — HYDROMORPHONE HYDROCHLORIDE 0.5 MG: 1 INJECTION, SOLUTION INTRAMUSCULAR; INTRAVENOUS; SUBCUTANEOUS at 10:55

## 2020-06-19 RX ADMIN — FENTANYL CITRATE 50 MCG: 50 INJECTION INTRAMUSCULAR; INTRAVENOUS at 10:50

## 2020-06-19 RX ADMIN — PROPOFOL 300 MG: 10 INJECTION, EMULSION INTRAVENOUS at 10:42

## 2020-06-19 RX ADMIN — MIDAZOLAM HYDROCHLORIDE 2 MG: 1 INJECTION, SOLUTION INTRAMUSCULAR; INTRAVENOUS at 10:38

## 2020-06-19 RX ADMIN — SODIUM CHLORIDE, SODIUM LACTATE, POTASSIUM CHLORIDE, AND CALCIUM CHLORIDE: .6; .31; .03; .02 INJECTION, SOLUTION INTRAVENOUS at 08:31

## 2020-06-19 NOTE — DISCHARGE INSTRUCTIONS
Incisional biopsy and drainage of hard palate mass  WHAT YOU SHOULD KNOW:   You underwent a [procedure where I drained and removed a portion of the mass that was on the roof of your mouth  AFTER YOU LEAVE:   Medications    Use alternating doses of Acetaminophen (Tylenol) and the the Naproxen sodium (Naprosyn) you already have to treat pain you may experience  Acetaminophen (Tylenol)  500 or 650 mg by mouth every 6 hours as needed      Naproxen  500 mg by mouth every 12 hours as needed      · Take your medicine as directed  Call your healthcare provider if you think your medicine is not helping or if you have side effects  Tell him if you are allergic to any medicine  Keep a list of the medicines, vitamins, and herbs you take  Include the amounts, and when and why you take them  Bring the list or the pill bottles to follow-up visits  Carry your medicine list with you in case of an emergency  ·   Follow up with your healthcare provider as directed:  Write down your questions so you remember to ask them during your visits  What to expect after surgery:   · Pain and swelling: The roof of your mouth may be sore up to 2 weeks after surgery  · Mild fever: You may have a low fever while the area heals  Drink liquids often to help reduce it  Mouth care: It is normal to have mouth pain and bad breath for a few days after surgery  Care for your mouth as follows:  · Brush your teeth gently  Avoid harsh gargling or tooth brushing  This can cause bleeding  · Gently rinse your mouth as directed to remove blood and mucus  You may use mouthwash if you wish  Food and drink:  You will need to follow a soft food diet for several days after surgery  · Drink plenty of liquids: This will help prevent fluid loss, keep your temperature down, decrease pain, and speed healing   Liquids and foods that are cool or cold, such as water, apple or grape juice, and popsicles, will help decrease pain and swelling  Do not drink orange juice or grapefruit juice  They may bother your throat  · Start with soft foods: Once you can drink liquids and your stomach is not upset, you may then have soft, plain foods  Begin with foods like applesauce, oatmeal, soft-boiled eggs, mashed potatoes, gelatin, and ice cream  Once you can eat soft food easily, you may slowly begin to eat solid foods  Avoid anything hot, spicy, or sharp, such as chips  These foods can hurt your tonsil areas  · Avoid hot food and drinks:  Avoid coffee, tea, soup, and other hot or warm foods and drinks  They can increase your risk for bleeding  Avoid milk and dairy foods if you have problems with thick mucus in your throat  This can cause you to cough, which could hurt your surgery areas  Self-care:   · Use ice:  Ice helps decrease swelling and pain  Use an ice pack or put crushed ice in a plastic bag  Cover the ice pack with a towel and place it on your throat for 15 to 20 minutes every hour for 2 days  · Use a cool humidifier: This will help moisten the air and soothe your throat  · Get plenty of rest:  Limit your activity for 7 to 10 days after surgery  It may take 2 weeks for you to recover  Ask when you can drive or return to work  · Do not smoke or go to smoky areas:  Until you heal, smoke may cause you to cough or your throat to start bleeding heavily  · Stay away from people who have colds, sore throats, or the flu: You may get sick more easily after surgery  Contact your surgeon or primary healthcare provider if:   · You have a fever  · You have throat pain or an earache that is worse than expected  · You have pus or blood draining down your throat  · You have itchy skin or a rash  · You have any questions or concerns about your condition or care  Seek care immediately or call 911 if:   · You have bright red bleeding from your nose or mouth, or bleeding that is worse than what you were told to expect  · You feel weak, dizzy, or like you might faint when you sit up or stand  · You have severe throat pain with drooling or voice changes  · Your neck is stiff and painful  · You have swelling or pain in your face or neck  · You have back or chest pain  · You have trouble breathing or swallowing  Call Dr Nuzhat Santa with any questions or concerns: office ; mobile  (urgent issues)

## 2020-06-19 NOTE — INTERIM OP NOTE
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    Procedure(s):  PALATE MASS INCISIONAL BIOPSY AND DRAINAGE (Right)    Surgeon(s) and Role:     * Mata Fofana MD - Primary    Specimens:  ID Type Source Tests Collected by Time Destination   1 : Portion of hard top mass, suspect cyst wall Tissue Hard palate TISSUE EXAM Mata Fofana MD 2020 [id number]        Estimated Blood Loss:   Minimal    Anesthesia Type:   General     Findings:   Cystic mass with thin wall, cyst cavity immediately entered with mucosal incision  Filled with thick mucin, no solid tumor or mass noted  Portion of likely cyst lining sent to pathology for evaluation       Complications:   None    SIGNATURE: Mata Fofana MD   DATE: 2020   TIME: 11:09 AM

## 2020-06-19 NOTE — INTERVAL H&P NOTE
H&P reviewed  After examining the patient I find no changes in the patients condition since the H&P had been written      Vitals:    06/19/20 0828   BP: 134/86   Pulse: 74   Resp: 18   Temp: (!) 97 3 °F (36 3 °C)   SpO2: 95%

## 2020-06-25 PROBLEM — G43.909 MIGRAINE: Status: ACTIVE | Noted: 2020-06-25

## 2020-07-21 PROBLEM — C05.0: Status: ACTIVE | Noted: 2020-07-21

## 2020-07-21 NOTE — H&P (VIEW-ONLY)
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[facility])  Pathology of excisional biopsy indicating mucoepidermoid carcinoma  Discussed history of facial fractures  Discussed options for treatment including acceptance vs surgical interventions  Reviewed other options for treatment including acceptance, palliative care, CT scan, PET scan, and surgical intervention  I recommended proceeding with excision to include margins, as removal with negative margins is considered to be definitive treatment for low grade mucoepidermoid carcinoma  Discussed post operative expectations including pain  I explained that it remains possible that that carcinoma is high grade due to sampling error, and briefly discussed the possible need for radiation therapy  Offered option of second opinion at [facility]  Answered pt and family questions   After discussion agreed to proceed with surgical interventions, excision of hard palate mass with margins  Follow up with surgical scheduling         Diagnoses and all orders for this visit:    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[address] )    Mass of hard palate          Subjective:      Patient ID: Gosia Beltrán is a 28 y o  male  Presents today for pre-operative visit  Pending surgery 07/29/2020  Excisional biopsy of hard palate mass 06/19/2020  Mild discomfort of hard palate  Feels roughness along palate  History facial fractures  The following portions of the patient's history were reviewed and updated as appropriate: allergies, current medications, past family history, past medical history, past social history, past surgical history and problem list     Review of Systems   Constitutional: Negative  HENT: Positive for mouth sores  Negative for congestion, ear discharge, ear pain, hearing loss, nosebleeds, postnasal drip, rhinorrhea, sinus pressure, sinus pain, sore throat, tinnitus and voice change      Eyes: Negative  Respiratory: Negative for chest tightness and shortness of breath  Cardiovascular: Negative  Gastrointestinal: Negative  Endocrine: Negative  Musculoskeletal: Negative  Skin: Negative for color change  Neurological: Negative for dizziness, numbness and headaches  Psychiatric/Behavioral: Negative  Objective:      Temp 97 6 °F (36 4 °C) (Temporal)   Ht 6' (1 829 m)   Wt 93 4 kg (206 lb)   BMI 27 94 kg/m²          Physical Exam   Constitutional: He is oriented to person, place, and time  He appears well-developed and well-nourished  He is cooperative  HENT:   Head: Normocephalic  Right Ear: Hearing, tympanic membrane, external ear and ear canal normal  No drainage or tenderness  Tympanic membrane is not perforated and not erythematous  No decreased hearing is noted  Left Ear: Hearing, tympanic membrane, external ear and ear canal normal  No drainage or tenderness  Tympanic membrane is not perforated and not erythematous  No decreased hearing is noted  Nose: Nose normal  No sinus tenderness, nasal deformity or septal deviation  Mouth/Throat: Uvula is midline, oropharynx is clear and moist and mucous membranes are normal  Mucous membranes are not pale and not dry  No oral lesions  Normal dentition  No oropharyngeal exudate  Hard palate mass     Neck: Normal range of motion and full passive range of motion without pain  Neck supple  No tracheal deviation present  Cardiovascular: Normal rate  Pulmonary/Chest: Effort normal  No accessory muscle usage  No respiratory distress  Musculoskeletal:        Right shoulder: He exhibits normal range of motion  Lymphadenopathy:     He has no cervical adenopathy  Neurological: He is alert and oriented to person, place, and time  No cranial nerve deficit or sensory deficit  Skin: Skin is warm, dry and intact  Psychiatric: He has a normal mood and affect         Scribe Attestation    I,:   KELLI Gomez am acting as a scribe while in the presence of the attending physician :        I,:   Jie Gore MD personally performed the services described in this documentation    as scribed in my presence :

## 2020-07-28 ENCOUNTER — ANESTHESIA EVENT (OUTPATIENT)
Dept: PERIOP | Facility: HOSPITAL | Age: 35
End: 2020-07-28
Payer: COMMERCIAL

## 2020-07-28 NOTE — ANESTHESIA PREPROCEDURE EVALUATION
Review of Systems/Medical History  Patient summary reviewed  Chart reviewed  No history of anesthetic complications     Cardiovascular  Exercise tolerance (METS): >4,  No dysrhythmias , No angina ,    Pulmonary  Asthma , well controlled/ stable , No shortness of breath, Sleep apnea ,        GI/Hepatic  Negative GI/hepatic ROS          Negative  ROS        Endo/Other  Negative endo/other ROS      GYN       Hematology  Negative hematology ROS      Musculoskeletal    Arthritis     Neurology  Negative neurology ROS      Psychology   No anxiety,              Physical Exam    Airway    Mallampati score: II  TM Distance: >3 FB  Neck ROM: full     Dental   No notable dental hx     Cardiovascular  Rhythm: regular, Rate: normal,     Pulmonary  Breath sounds clear to auscultation,     Other Findings        Anesthesia Plan  ASA Score- 2     Anesthesia Type- general with ASA Monitors  Additional Monitors:   Airway Plan: ETT  Plan Factors-  Patient did not smoke on day of surgery  Induction- intravenous  Postoperative Plan- Plan for postoperative opioid use  Planned trial extubation    Informed Consent- Anesthetic plan and risks discussed with patient  I personally reviewed this patient with the CRNA  Discussed and agreed on the Anesthesia Plan with the CRNA  [place]

## 2020-07-29 ENCOUNTER — ANESTHESIA (OUTPATIENT)
Dept: PERIOP | Facility: HOSPITAL | Age: 35
End: 2020-07-29
Payer: COMMERCIAL

## 2020-07-29 ENCOUNTER — HOSPITAL ENCOUNTER (OUTPATIENT)
Facility: HOSPITAL | Age: 35
Setting detail: OUTPATIENT SURGERY
Discharge: HOME | End: 2020-07-29
Attending: SPECIALIST | Admitting: SPECIALIST
Payer: COMMERCIAL

## 2020-07-29 VITALS
DIASTOLIC BLOOD PRESSURE: 79 MMHG | TEMPERATURE: 97 F | HEART RATE: 69 BPM | SYSTOLIC BLOOD PRESSURE: 132 MMHG | OXYGEN SATURATION: 95 % | RESPIRATION RATE: 18 BRPM

## 2020-07-29 DIAGNOSIS — C05.0 MUCOEPIDERMOID CARCINOMA OF HARD PALATE (HCC): Primary | ICD-10-CM

## 2020-07-29 DIAGNOSIS — C05.0 CANCER OF HARD PALATE (HCC): ICD-10-CM

## 2020-07-29 PROCEDURE — 88307 TISSUE EXAM BY PATHOLOGIST: CPT | Performed by: PATHOLOGY

## 2020-07-29 PROCEDURE — 88342 IMHCHEM/IMCYTCHM 1ST ANTB: CPT | Performed by: PATHOLOGY

## 2020-07-29 PROCEDURE — 42120 REMOVE PALATE/LESION: CPT | Performed by: SPECIALIST

## 2020-07-29 RX ORDER — FENTANYL CITRATE 50 UG/ML
INJECTION, SOLUTION INTRAMUSCULAR; INTRAVENOUS AS NEEDED
Status: DISCONTINUED | OUTPATIENT
Start: 2020-07-29 | End: 2020-07-29 | Stop reason: SURG

## 2020-07-29 RX ORDER — MIDAZOLAM HYDROCHLORIDE 2 MG/2ML
INJECTION, SOLUTION INTRAMUSCULAR; INTRAVENOUS AS NEEDED
Status: DISCONTINUED | OUTPATIENT
Start: 2020-07-29 | End: 2020-07-29 | Stop reason: SURG

## 2020-07-29 RX ORDER — DEXAMETHASONE SODIUM PHOSPHATE 10 MG/ML
INJECTION, SOLUTION INTRAMUSCULAR; INTRAVENOUS AS NEEDED
Status: DISCONTINUED | OUTPATIENT
Start: 2020-07-29 | End: 2020-07-29 | Stop reason: SURG

## 2020-07-29 RX ORDER — GLYCOPYRROLATE 0.2 MG/ML
INJECTION INTRAMUSCULAR; INTRAVENOUS AS NEEDED
Status: DISCONTINUED | OUTPATIENT
Start: 2020-07-29 | End: 2020-07-29 | Stop reason: SURG

## 2020-07-29 RX ORDER — ONDANSETRON 2 MG/ML
4 INJECTION INTRAMUSCULAR; INTRAVENOUS ONCE AS NEEDED
Status: DISCONTINUED | OUTPATIENT
Start: 2020-07-29 | End: 2020-07-29 | Stop reason: HOSPADM

## 2020-07-29 RX ORDER — LIDOCAINE HYDROCHLORIDE AND EPINEPHRINE 10; 10 MG/ML; UG/ML
INJECTION, SOLUTION INFILTRATION; PERINEURAL AS NEEDED
Status: DISCONTINUED | OUTPATIENT
Start: 2020-07-29 | End: 2020-07-29 | Stop reason: HOSPADM

## 2020-07-29 RX ORDER — FENTANYL CITRATE/PF 50 MCG/ML
25 SYRINGE (ML) INJECTION
Status: COMPLETED | OUTPATIENT
Start: 2020-07-29 | End: 2020-07-29

## 2020-07-29 RX ORDER — ROCURONIUM BROMIDE 10 MG/ML
INJECTION, SOLUTION INTRAVENOUS AS NEEDED
Status: DISCONTINUED | OUTPATIENT
Start: 2020-07-29 | End: 2020-07-29 | Stop reason: SURG

## 2020-07-29 RX ORDER — PROPOFOL 10 MG/ML
INJECTION, EMULSION INTRAVENOUS CONTINUOUS PRN
Status: DISCONTINUED | OUTPATIENT
Start: 2020-07-29 | End: 2020-07-29 | Stop reason: SURG

## 2020-07-29 RX ORDER — ACETAMINOPHEN 325 MG/1
650 TABLET ORAL EVERY 6 HOURS PRN
Status: DISCONTINUED | OUTPATIENT
Start: 2020-07-29 | End: 2020-07-29 | Stop reason: HOSPADM

## 2020-07-29 RX ORDER — ONDANSETRON 2 MG/ML
INJECTION INTRAMUSCULAR; INTRAVENOUS AS NEEDED
Status: DISCONTINUED | OUTPATIENT
Start: 2020-07-29 | End: 2020-07-29 | Stop reason: SURG

## 2020-07-29 RX ORDER — PROPOFOL 10 MG/ML
INJECTION, EMULSION INTRAVENOUS AS NEEDED
Status: DISCONTINUED | OUTPATIENT
Start: 2020-07-29 | End: 2020-07-29 | Stop reason: SURG

## 2020-07-29 RX ORDER — OXYCODONE HYDROCHLORIDE 5 MG/1
5 TABLET ORAL EVERY 4 HOURS PRN
Status: DISCONTINUED | OUTPATIENT
Start: 2020-07-29 | End: 2020-07-29 | Stop reason: HOSPADM

## 2020-07-29 RX ORDER — LIDOCAINE HYDROCHLORIDE 10 MG/ML
INJECTION, SOLUTION EPIDURAL; INFILTRATION; INTRACAUDAL; PERINEURAL AS NEEDED
Status: DISCONTINUED | OUTPATIENT
Start: 2020-07-29 | End: 2020-07-29 | Stop reason: SURG

## 2020-07-29 RX ORDER — HYDROMORPHONE HCL 110MG/55ML
PATIENT CONTROLLED ANALGESIA SYRINGE INTRAVENOUS AS NEEDED
Status: DISCONTINUED | OUTPATIENT
Start: 2020-07-29 | End: 2020-07-29 | Stop reason: SURG

## 2020-07-29 RX ORDER — IBUPROFEN 200 MG
400 TABLET ORAL EVERY 6 HOURS PRN
Qty: 120 TABLET | Refills: 0
Start: 2020-07-29 | End: 2020-11-06

## 2020-07-29 RX ORDER — DEXMEDETOMIDINE HYDROCHLORIDE 100 UG/ML
INJECTION, SOLUTION INTRAVENOUS AS NEEDED
Status: DISCONTINUED | OUTPATIENT
Start: 2020-07-29 | End: 2020-07-29 | Stop reason: SURG

## 2020-07-29 RX ORDER — OXYCODONE HYDROCHLORIDE 5 MG/1
5 TABLET ORAL EVERY 6 HOURS PRN
Qty: 20 TABLET | Refills: 0 | Status: SHIPPED | OUTPATIENT
Start: 2020-07-29 | End: 2020-08-06 | Stop reason: SDUPTHER

## 2020-07-29 RX ORDER — NEOSTIGMINE METHYLSULFATE 1 MG/ML
INJECTION INTRAVENOUS AS NEEDED
Status: DISCONTINUED | OUTPATIENT
Start: 2020-07-29 | End: 2020-07-29 | Stop reason: SURG

## 2020-07-29 RX ORDER — LIDOCAINE HYDROCHLORIDE 10 MG/ML
0.5 INJECTION, SOLUTION EPIDURAL; INFILTRATION; INTRACAUDAL; PERINEURAL ONCE AS NEEDED
Status: DISCONTINUED | OUTPATIENT
Start: 2020-07-29 | End: 2020-07-29 | Stop reason: HOSPADM

## 2020-07-29 RX ORDER — SODIUM CHLORIDE, SODIUM LACTATE, POTASSIUM CHLORIDE, CALCIUM CHLORIDE 600; 310; 30; 20 MG/100ML; MG/100ML; MG/100ML; MG/100ML
75 INJECTION, SOLUTION INTRAVENOUS CONTINUOUS
Status: DISCONTINUED | OUTPATIENT
Start: 2020-07-29 | End: 2020-07-29 | Stop reason: HOSPADM

## 2020-07-29 RX ORDER — HYDROMORPHONE HCL/PF 1 MG/ML
0.5 SYRINGE (ML) INJECTION
Status: DISCONTINUED | OUTPATIENT
Start: 2020-07-29 | End: 2020-07-29 | Stop reason: HOSPADM

## 2020-07-29 RX ADMIN — MIDAZOLAM HYDROCHLORIDE 2 MG: 1 INJECTION, SOLUTION INTRAMUSCULAR; INTRAVENOUS at 11:09

## 2020-07-29 RX ADMIN — FENTANYL CITRATE 25 MCG: 50 INJECTION INTRAMUSCULAR; INTRAVENOUS at 11:25

## 2020-07-29 RX ADMIN — NEOSTIGMINE METHYLSULFATE 3 MG: 1 INJECTION INTRAVENOUS at 12:03

## 2020-07-29 RX ADMIN — DEXMEDETOMIDINE HYDROCHLORIDE 12 MCG: 100 INJECTION, SOLUTION INTRAVENOUS at 11:50

## 2020-07-29 RX ADMIN — PROPOFOL 50 MG: 10 INJECTION, EMULSION INTRAVENOUS at 11:17

## 2020-07-29 RX ADMIN — DEXMEDETOMIDINE HYDROCHLORIDE 12 MCG: 100 INJECTION, SOLUTION INTRAVENOUS at 12:04

## 2020-07-29 RX ADMIN — FENTANYL CITRATE 50 MCG: 50 INJECTION INTRAMUSCULAR; INTRAVENOUS at 11:16

## 2020-07-29 RX ADMIN — DEXAMETHASONE SODIUM PHOSPHATE 10 MG: 10 INJECTION, SOLUTION INTRAMUSCULAR; INTRAVENOUS at 11:20

## 2020-07-29 RX ADMIN — PROPOFOL 200 MG: 10 INJECTION, EMULSION INTRAVENOUS at 11:16

## 2020-07-29 RX ADMIN — FENTANYL CITRATE 25 MCG: 50 INJECTION INTRAMUSCULAR; INTRAVENOUS at 12:43

## 2020-07-29 RX ADMIN — DEXMEDETOMIDINE HYDROCHLORIDE 12 MCG: 100 INJECTION, SOLUTION INTRAVENOUS at 11:57

## 2020-07-29 RX ADMIN — LIDOCAINE HYDROCHLORIDE 50 MG: 10 INJECTION, SOLUTION EPIDURAL; INFILTRATION; INTRACAUDAL; PERINEURAL at 11:16

## 2020-07-29 RX ADMIN — ONDANSETRON 4 MG: 2 INJECTION INTRAMUSCULAR; INTRAVENOUS at 11:52

## 2020-07-29 RX ADMIN — PROPOFOL 100 MCG/KG/MIN: 10 INJECTION, EMULSION INTRAVENOUS at 11:20

## 2020-07-29 RX ADMIN — FENTANYL CITRATE 25 MCG: 50 INJECTION INTRAMUSCULAR; INTRAVENOUS at 12:27

## 2020-07-29 RX ADMIN — HYDROMORPHONE HYDROCHLORIDE 0.5 MG: 2 INJECTION INTRAMUSCULAR; INTRAVENOUS; SUBCUTANEOUS at 11:25

## 2020-07-29 RX ADMIN — GLYCOPYRROLATE 0.8 MG: 0.2 INJECTION, SOLUTION INTRAMUSCULAR; INTRAVENOUS at 12:03

## 2020-07-29 RX ADMIN — FENTANYL CITRATE 25 MCG: 50 INJECTION INTRAMUSCULAR; INTRAVENOUS at 12:32

## 2020-07-29 RX ADMIN — FENTANYL CITRATE 25 MCG: 50 INJECTION INTRAMUSCULAR; INTRAVENOUS at 12:37

## 2020-07-29 RX ADMIN — SODIUM CHLORIDE, SODIUM LACTATE, POTASSIUM CHLORIDE, AND CALCIUM CHLORIDE: .6; .31; .03; .02 INJECTION, SOLUTION INTRAVENOUS at 11:09

## 2020-07-29 RX ADMIN — ROCURONIUM BROMIDE 50 MG: 10 SOLUTION INTRAVENOUS at 11:16

## 2020-07-29 RX ADMIN — HYDROMORPHONE HYDROCHLORIDE 0.5 MG: 1 INJECTION, SOLUTION INTRAMUSCULAR; INTRAVENOUS; SUBCUTANEOUS at 13:00

## 2020-07-29 RX ADMIN — FENTANYL CITRATE 25 MCG: 50 INJECTION INTRAMUSCULAR; INTRAVENOUS at 11:50

## 2020-07-29 NOTE — DISCHARGE INSTRUCTIONS
Excision of hard palate tumor  WHAT YOU SHOULD KNOW:   You underwent excision of a hard palate tumor, a surgery where your surgeon removed a portion of the roof of your mouth to accomplish treatment of a tumor  AFTER YOU LEAVE:   Medications    Use alternating doses of Acetaminophen (Tylenol) and Ibuprofen (Motrin) every 3 hours  Example:  9:00 am 12:00 pm 3:00 pm 6:00 pm 9:00 pm 12:00 am 3:00 am 6:00 am 9:00 am   Tylenol Motrin Tylenol Motrin Tylenol Motrin Tylenol Motrin Tylenol       Acetaminophen (Tylenol)  500 or 650 mg by mouth every 6 hours    Alternating with:    Ibuprofen (Motrin)  400 mg by mouth every 6 hours      Use ONLY as needed for breakthrough pain:    Oxycodone   5mg by mouth every 6 hours as needed        NOTE: Do not exceed more than 2 grams of Acetaminophen in 24 hours if under 15years old, or 3-4 grams of Acetaminophen in 24 hours if over 15years old  Do not take more than 1 medication containing acetaminophen (Tylenol) at the same time  · Take your medicine as directed  Call your healthcare provider if you think your medicine is not helping or if you have side effects  Tell him if you are allergic to any medicine  Keep a list of the medicines, vitamins, and herbs you take  Include the amounts, and when and why you take them  Bring the list or the pill bottles to follow-up visits  Carry your medicine list with you in case of an emergency  Follow up with your healthcare provider as directed:  Write down your questions so you remember to ask them during your visits  What to expect after surgery:   · Pain and swelling: Your tongue may be sore up to 2 weeks after surgery  · Mild fever: You may have a low fever while your tonsil areas heal  Drink liquids often to help reduce it  Mouth care: It is normal to have mouth pain and bad breath for a few days after surgery  Care for your mouth as follows:  · Brush your teeth gently  Avoid harsh gargling or tooth brushing  This can cause bleeding  · Gently rinse your mouth as directed to remove blood and mucus  Food and drink:  You will need to follow a liquid diet or soft food diet for several days after surgery  · Drink plenty of liquids: This will help prevent fluid loss, keep your temperature down, decrease pain, and speed healing  Liquids and foods that are cool or cold, such as water, apple or grape juice, and popsicles, will help decrease pain and swelling  Do not drink orange juice or grapefruit juice  They may bother your throat  · Start with soft foods: Once you can drink liquids and your stomach is not upset, you may then have soft, plain foods  Begin with foods like applesauce, oatmeal, soft-boiled eggs, mashed potatoes, gelatin, and ice cream  Once you can eat soft food easily, you may slowly begin to eat solid foods  Avoid anything hot, spicy, or sharp, such as chips  These foods can hurt your tonsil areas  · Avoid hot food and drinks:  Avoid coffee, tea, soup, and other hot or warm foods and drinks  They can increase your risk for bleeding  Avoid milk and dairy foods if you have problems with thick mucus in your throat  This can cause you to cough, which could hurt your surgery areas  Self-care:   · Use ice:  Ice helps decrease swelling and pain  Use an ice pack or put crushed ice in a plastic bag  Cover the ice pack with a towel and place it on your throat for 15 to 20 minutes every hour for 2 days  · Use a cool humidifier: This will help moisten the air and soothe your throat  · Get plenty of rest:  Limit your activity for 7 to 10 days after surgery  It may take 2 weeks for you to recover  Ask when you can drive or return to work  · Do not smoke or go to smoky areas:  Until you heal, smoke may cause you to cough or your throat to start bleeding heavily  · Stay away from people who have colds, sore throats, or the flu: You may get sick more easily after surgery    Contact your surgeon or primary healthcare provider if:   · You have a fever  · You have throat pain or an earache that is worse than expected  · You have pus or blood draining down your throat  · You have itchy skin or a rash  · You have any questions or concerns about your condition or care  Seek care immediately or call 911 if:   · You have bright red bleeding from your nose or mouth, or bleeding that is worse than what you were told to expect  · You feel weak, dizzy, or like you might faint when you sit up or stand  · You have severe throat pain with drooling or voice changes  · Your neck is stiff and painful  · You have swelling or pain in your face or neck  · You have back or chest pain  · You have trouble breathing or swallowing  Call Dr Patsy Mayorga with any questions or concerns: office ; mobile  (urgent issues)

## 2020-07-29 NOTE — OP NOTE
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[facility] [address] ) [C05 0]    Post-Op Diagnosis Codes:     * Cancer of hard palate ([address] ) [C05 0]    Procedure(s):  EXCISION MALIGNANT NEOPLASM, HARD PALATE    Specimen(s):  ID Type Source Tests Collected by Time Destination   1 : Hard palate tumor, see comments  Tissue Hard palate TISSUE EXAM Leonel Reardon MD 2020 1152        Estimated Blood Loss:   30 mL    Drains:  NONE    Anesthesia Type:   General    Operative Indications:  Cancer of hard palate ([facility] [address] ) [C05 0], confirmed via previous biopsy  As such, wide excision was indicated to try to accomplish definitive treatment    Operative Findings:  Submucosal mass, resected  Tumor abutting hard palate, but palate intact, and tumor readily elevated off underlying bone  Size of removed overlying mucosa measures 2 3 x 2 1 cm, overall size of removed tumor with surrounding soft tissue margins 2 3 x 2 1 x 1 9 cm  Complications:   None    Procedure and Technique:  The patient was positively identified and transferred onto the operating table in the supine position  Appropriate monitoring devices were put in place, anesthesia was induced and the patient was intubated without difficulty  Before proceeding further, the time out procedure was completed  The patient was draped in the usual clean fashion  A McIvor oral gag was introduced opened and suspended from the edge of the Melissa stand  The previously biopsied cystic mass on the hard palate on the left was no longer visible, but a submucosal mass was palpable  1% lidocaine with 1/100,000 epinephrine was injected surrounding the mass    After allowing adequate time for anesthetic and vasoconstrictive effect, surgery was begun by making mucosal incisions though mucosa surrounding the mass using the needle point tip Bovie cautery  Dissection continued through submucosal tissue using Bovie cautery down to the hard palate medially, and anteriorly  Using a [place] elevator, overlying tissue was readily elevated off the underlying hard palate  Dissection cotinued through soft tissue laterally down to the underlying hard palate  Posteriorly remaining soft tissue was divided and the hard palate mass with overlying mucosa was removed, oriented, and sent to pathology for permanent section evaluation  Careful examination of the surgical defect revealed the hard palate to be intact, in spite of the possibility of a small dehiscence seen on CT evaluation  A small defect through soft tissue into the nasopharynx just posterior to the free margin of the hard palate was closed using a 4-0 vicryl stitch in a figure of eight fashion  The oral cavity was irrigated, and good hemostasis was noted  The McIvor oral gag was then removed  Anesthesia was reversed  The patient was awakened, extubated and taken to the recovery room in stable condition  All counts were correct at the end of the case, and no complications were encountered       I was present for the entire procedure    Patient Disposition:  PACU  and extubated and stable    SIGNATURE: Fang Perry MD  DATE: July 29, 2020  TIME: 12:09 PM

## 2020-07-29 NOTE — ANESTHESIA POSTPROCEDURE EVALUATION
Post-Op Assessment Note    CV Status:  Stable  Pain Score: 0    Pain management: adequate     Mental Status:  Alert and awake   Hydration Status:  Euvolemic   PONV Controlled:  Controlled   Airway Patency:  Patent and adequate   Post Op Vitals Reviewed: Yes      Staff: CRNA           BP   127/67   Temp   97 4   Pulse 70   Resp   16   SpO2   98

## 2020-07-29 NOTE — INTERVAL H&P NOTE
H&P reviewed  After examining the patient I find no changes in the patients condition since the H&P had been written      Vitals:    07/29/20 1018   BP: 133/89   Pulse: 75   Resp: 18   Temp: 98 °F (36 7 °C)   SpO2: 98%
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
## 2020-11-06 ENCOUNTER — OFFICE VISIT (OUTPATIENT)
Dept: FAMILY MEDICINE CLINIC | Facility: CLINIC | Age: 35
End: 2020-11-06
Payer: COMMERCIAL

## 2020-11-06 VITALS
WEIGHT: 202 LBS | SYSTOLIC BLOOD PRESSURE: 138 MMHG | RESPIRATION RATE: 20 BRPM | TEMPERATURE: 97.3 F | DIASTOLIC BLOOD PRESSURE: 96 MMHG | HEART RATE: 72 BPM | HEIGHT: 72 IN | BODY MASS INDEX: 27.36 KG/M2

## 2020-11-06 DIAGNOSIS — M54.50 ACUTE MIDLINE LOW BACK PAIN WITHOUT SCIATICA: Primary | ICD-10-CM

## 2020-11-06 DIAGNOSIS — M77.12 LATERAL EPICONDYLITIS OF LEFT ELBOW: ICD-10-CM

## 2020-11-06 PROCEDURE — 3008F BODY MASS INDEX DOCD: CPT | Performed by: FAMILY MEDICINE

## 2020-11-06 PROCEDURE — 1036F TOBACCO NON-USER: CPT | Performed by: FAMILY MEDICINE

## 2020-11-06 PROCEDURE — 99213 OFFICE O/P EST LOW 20 MIN: CPT | Performed by: FAMILY MEDICINE

## 2020-11-06 PROCEDURE — 3725F SCREEN DEPRESSION PERFORMED: CPT | Performed by: FAMILY MEDICINE

## 2020-11-06 RX ORDER — METHOCARBAMOL 500 MG/1
500 TABLET, FILM COATED ORAL EVERY 8 HOURS PRN
Qty: 60 TABLET | Refills: 0 | Status: SHIPPED | OUTPATIENT
Start: 2020-11-06 | End: 2022-03-21

## 2020-11-06 RX ORDER — NAPROXEN 500 MG/1
500 TABLET ORAL 2 TIMES DAILY WITH MEALS
Qty: 60 TABLET | Refills: 0 | Status: SHIPPED | OUTPATIENT
Start: 2020-11-06 | End: 2020-12-18

## 2020-11-24 ENCOUNTER — EVALUATION (OUTPATIENT)
Dept: PHYSICAL THERAPY | Facility: REHABILITATION | Age: 35
End: 2020-11-24
Payer: COMMERCIAL

## 2020-11-24 DIAGNOSIS — M77.12 LATERAL EPICONDYLITIS OF LEFT ELBOW: ICD-10-CM

## 2020-11-24 DIAGNOSIS — M54.50 ACUTE MIDLINE LOW BACK PAIN WITHOUT SCIATICA: ICD-10-CM

## 2020-11-24 PROCEDURE — 97161 PT EVAL LOW COMPLEX 20 MIN: CPT | Performed by: PHYSICAL THERAPIST

## 2020-11-24 PROCEDURE — 97140 MANUAL THERAPY 1/> REGIONS: CPT | Performed by: PHYSICAL THERAPIST

## 2020-11-30 ENCOUNTER — APPOINTMENT (OUTPATIENT)
Dept: PHYSICAL THERAPY | Facility: REHABILITATION | Age: 35
End: 2020-11-30
Payer: COMMERCIAL

## 2020-12-01 ENCOUNTER — OFFICE VISIT (OUTPATIENT)
Dept: PHYSICAL THERAPY | Facility: REHABILITATION | Age: 35
End: 2020-12-01
Payer: COMMERCIAL

## 2020-12-01 DIAGNOSIS — M77.12 LATERAL EPICONDYLITIS OF LEFT ELBOW: ICD-10-CM

## 2020-12-01 DIAGNOSIS — M54.50 ACUTE MIDLINE LOW BACK PAIN WITHOUT SCIATICA: Primary | ICD-10-CM

## 2020-12-01 PROCEDURE — 97110 THERAPEUTIC EXERCISES: CPT

## 2020-12-01 PROCEDURE — 97112 NEUROMUSCULAR REEDUCATION: CPT

## 2020-12-01 PROCEDURE — 97140 MANUAL THERAPY 1/> REGIONS: CPT

## 2020-12-08 ENCOUNTER — OFFICE VISIT (OUTPATIENT)
Dept: PHYSICAL THERAPY | Facility: REHABILITATION | Age: 35
End: 2020-12-08
Payer: COMMERCIAL

## 2020-12-08 DIAGNOSIS — M54.50 ACUTE MIDLINE LOW BACK PAIN WITHOUT SCIATICA: Primary | ICD-10-CM

## 2020-12-08 DIAGNOSIS — M77.12 LATERAL EPICONDYLITIS OF LEFT ELBOW: ICD-10-CM

## 2020-12-08 PROCEDURE — 97010 HOT OR COLD PACKS THERAPY: CPT

## 2020-12-08 PROCEDURE — 97110 THERAPEUTIC EXERCISES: CPT

## 2020-12-08 PROCEDURE — 97112 NEUROMUSCULAR REEDUCATION: CPT

## 2020-12-08 PROCEDURE — 97140 MANUAL THERAPY 1/> REGIONS: CPT

## 2020-12-15 ENCOUNTER — APPOINTMENT (OUTPATIENT)
Dept: PHYSICAL THERAPY | Facility: REHABILITATION | Age: 35
End: 2020-12-15
Payer: COMMERCIAL

## 2020-12-18 DIAGNOSIS — M54.50 ACUTE MIDLINE LOW BACK PAIN WITHOUT SCIATICA: ICD-10-CM

## 2020-12-18 DIAGNOSIS — M77.12 LATERAL EPICONDYLITIS OF LEFT ELBOW: ICD-10-CM

## 2020-12-18 RX ORDER — NAPROXEN 500 MG/1
TABLET ORAL
Qty: 60 TABLET | Refills: 0 | Status: SHIPPED | OUTPATIENT
Start: 2020-12-18 | End: 2022-03-21

## 2020-12-21 ENCOUNTER — EVALUATION (OUTPATIENT)
Dept: PHYSICAL THERAPY | Facility: REHABILITATION | Age: 35
End: 2020-12-21
Payer: COMMERCIAL

## 2020-12-21 DIAGNOSIS — M54.50 ACUTE MIDLINE LOW BACK PAIN WITHOUT SCIATICA: Primary | ICD-10-CM

## 2020-12-21 DIAGNOSIS — M77.12 LATERAL EPICONDYLITIS OF LEFT ELBOW: ICD-10-CM

## 2020-12-21 PROCEDURE — 97140 MANUAL THERAPY 1/> REGIONS: CPT

## 2020-12-21 PROCEDURE — 97110 THERAPEUTIC EXERCISES: CPT

## 2020-12-21 PROCEDURE — 97112 NEUROMUSCULAR REEDUCATION: CPT

## 2020-12-29 ENCOUNTER — OFFICE VISIT (OUTPATIENT)
Dept: PHYSICAL THERAPY | Facility: REHABILITATION | Age: 35
End: 2020-12-29
Payer: COMMERCIAL

## 2020-12-29 DIAGNOSIS — M54.50 ACUTE MIDLINE LOW BACK PAIN WITHOUT SCIATICA: Primary | ICD-10-CM

## 2020-12-29 DIAGNOSIS — M77.12 LATERAL EPICONDYLITIS OF LEFT ELBOW: ICD-10-CM

## 2020-12-29 PROCEDURE — 97110 THERAPEUTIC EXERCISES: CPT | Performed by: PHYSICAL THERAPIST

## 2020-12-29 PROCEDURE — 97140 MANUAL THERAPY 1/> REGIONS: CPT | Performed by: PHYSICAL THERAPIST

## 2020-12-29 PROCEDURE — 97112 NEUROMUSCULAR REEDUCATION: CPT | Performed by: PHYSICAL THERAPIST

## 2021-01-30 ENCOUNTER — HOSPITAL ENCOUNTER (OUTPATIENT)
Dept: CT IMAGING | Facility: HOSPITAL | Age: 36
Discharge: HOME | End: 2021-01-30
Payer: COMMERCIAL

## 2021-01-30 DIAGNOSIS — C05.0 MUCOEPIDERMOID CARCINOMA OF HARD PALATE (HCC): ICD-10-CM

## 2021-01-30 PROCEDURE — G1004 CDSM NDSC: HCPCS

## 2021-01-30 PROCEDURE — 70491 CT SOFT TISSUE NECK W/DYE: CPT

## 2021-01-30 RX ADMIN — IOHEXOL 85 ML: 350 INJECTION, SOLUTION INTRAVENOUS at 08:08

## 2021-02-26 ENCOUNTER — OFFICE VISIT (OUTPATIENT)
Dept: FAMILY MEDICINE CLINIC | Facility: CLINIC | Age: 36
End: 2021-02-26
Payer: COMMERCIAL

## 2021-02-26 VITALS
SYSTOLIC BLOOD PRESSURE: 110 MMHG | TEMPERATURE: 98.1 F | HEIGHT: 72 IN | BODY MASS INDEX: 27.5 KG/M2 | DIASTOLIC BLOOD PRESSURE: 74 MMHG | WEIGHT: 203 LBS | HEART RATE: 72 BPM

## 2021-02-26 DIAGNOSIS — L72.3 SEBACEOUS CYST: Primary | ICD-10-CM

## 2021-02-26 PROCEDURE — 3008F BODY MASS INDEX DOCD: CPT | Performed by: PHYSICIAN ASSISTANT

## 2021-02-26 PROCEDURE — 99214 OFFICE O/P EST MOD 30 MIN: CPT | Performed by: PHYSICIAN ASSISTANT

## 2021-02-26 PROCEDURE — 1036F TOBACCO NON-USER: CPT | Performed by: PHYSICIAN ASSISTANT

## 2021-02-26 RX ORDER — MULTIVIT-MIN/IRON/FOLIC ACID/K 18-600-40
CAPSULE ORAL
COMMUNITY

## 2021-02-26 RX ORDER — MULTIVIT-MIN/IRON/FOLIC ACID/K 18-600-40
CAPSULE ORAL
COMMUNITY
End: 2022-03-21

## 2021-02-26 RX ORDER — CEPHALEXIN 250 MG/1
250 CAPSULE ORAL EVERY 8 HOURS SCHEDULED
Qty: 90 CAPSULE | Refills: 0 | Status: SHIPPED | OUTPATIENT
Start: 2021-02-26 | End: 2021-03-28

## 2021-02-26 NOTE — PATIENT INSTRUCTIONS
Problem List Items Addressed This Visit        Musculoskeletal and Integument    Sebaceous cyst - Primary       Sebaceous cyst scalp currently decreasing in size and tenderness  Diagnosis explained  Patient can use warm compresses  I am giving him a prescription for an antibiotic in case his lump gets bigger instead of smaller and becomes more painful    If at any point we can refer him to plastic surgery for removal          Relevant Medications    cephalexin (KEFLEX) 250 mg capsule

## 2021-02-26 NOTE — PROGRESS NOTES
Assessment and Plan:    Problem List Items Addressed This Visit        Musculoskeletal and Integument    Sebaceous cyst - Primary       Sebaceous cyst scalp currently decreasing in size and tenderness  Diagnosis explained  Patient can use warm compresses  I am giving him a prescription for an antibiotic in case his lump gets bigger instead of smaller and becomes more painful  If at any point we can refer him to plastic surgery for removal          Relevant Medications    cephalexin (KEFLEX) 250 mg capsule                 Diagnoses and all orders for this visit:    Sebaceous cyst  -     cephalexin (KEFLEX) 250 mg capsule; Take 1 capsule (250 mg total) by mouth every 8 (eight) hours    Other orders  -     Vitamin D, Cholecalciferol, 50 MCG (2000 UT) CAPS; Take by mouth  -     Ascorbic Acid (Vitamin C) 500 MG CAPS; Take by mouth  -     Elderberry 575 MG/5ML SYRP; Take by mouth  -     B Complex Vitamins (VITAMIN B COMPLEX PO); Take by mouth  -     vitamin E 100 UNIT capsule; Take 100 Units by mouth daily              Subjective:      Patient ID: Jose Alberto Khan is a 28 y o  male  CC:    Chief Complaint   Patient presents with    Mass     Lump on the back of his head that is sensitive to touch  No specific injury  Pt expresses concern due to previous history of cancerous lesion in his mouth  -  [facility]       HPI:     Patient here today because he found a lump on his scalp that was bigger now it is smaller and it was more tender than it is now but now is only slightly tender and he is wondering if he needs to worry about cancer  He did have a cyst removed in his skin on the right anterior neck in the past   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 and had reconstructions and excision of this tumor and is worried        The following portions of the patient's history were reviewed and updated as appropriate: allergies, current medications, past family history, past medical history, past social history, past surgical history and problem list       Review of Systems   Constitutional: Negative  HENT: Negative  Eyes: Negative  Respiratory: Negative  Cardiovascular: Negative  Gastrointestinal: Negative  Endocrine: Negative  Genitourinary: Negative  Musculoskeletal: Negative  Skin: Negative  Allergic/Immunologic: Negative  Neurological: Negative  Hematological: Negative  Psychiatric/Behavioral: Negative  Data to review:       Objective:    Vitals:    02/26/21 0950   BP: 110/74   BP Location: Left arm   Patient Position: Sitting   Cuff Size: Large   Pulse: 72   Temp: 98 1 °F (36 7 °C)   TempSrc: Oral   Weight: 92 1 kg (203 lb)   Height: 6' (1 829 m)        Physical Exam  Vitals signs and nursing note reviewed  Constitutional:       Appearance: Normal appearance  HENT:      Head: Normocephalic and atraumatic  Eyes:      General: Lids are normal       Conjunctiva/sclera: Conjunctivae normal       Pupils: Pupils are equal, round, and reactive to light  Cardiovascular:      Rate and Rhythm: Normal rate and regular rhythm  Heart sounds: Normal heart sounds  Pulmonary:      Effort: Pulmonary effort is normal       Breath sounds: Normal breath sounds  Skin:     General: Skin is warm and dry  Comments:  Patient has a half of a cm movable slightly tender rubbery cyst  Posterior left parietal scalp  Neurological:      General: No focal deficit present  Mental Status: He is alert  Mental status is at baseline  Psychiatric:         Mood and Affect: Mood normal          Behavior: Behavior normal          Thought Content: Thought content normal          Judgment: Judgment normal            BMI Counseling: Body mass index is 27 53 kg/m²   The BMI is above normal  Nutrition recommendations include decreasing portion sizes, encouraging healthy choices of fruits and vegetables, decreasing fast food intake, consuming healthier snacks, limiting drinks that contain sugar, moderation in carbohydrate intake, increasing intake of lean protein, reducing intake of saturated and trans fat and reducing intake of cholesterol  Exercise recommendations include exercising 3-5 times per week  No pharmacotherapy was ordered

## 2021-02-26 NOTE — ASSESSMENT & PLAN NOTE
Sebaceous cyst scalp currently decreasing in size and tenderness  Diagnosis explained  Patient can use warm compresses  I am giving him a prescription for an antibiotic in case his lump gets bigger instead of smaller and becomes more painful    If at any point we can refer him to plastic surgery for removal

## 2021-03-09 PROCEDURE — 88305 TISSUE EXAM BY PATHOLOGIST: CPT | Performed by: PATHOLOGY

## 2021-03-09 PROCEDURE — 88342 IMHCHEM/IMCYTCHM 1ST ANTB: CPT | Performed by: PATHOLOGY

## 2021-08-07 ENCOUNTER — HOSPITAL ENCOUNTER (OUTPATIENT)
Dept: CT IMAGING | Facility: HOSPITAL | Age: 36
Discharge: HOME | End: 2021-08-07
Payer: COMMERCIAL

## 2021-08-07 DIAGNOSIS — M85.88 MASS OF HARD PALATE: ICD-10-CM

## 2021-08-07 DIAGNOSIS — C05.0 MUCOEPIDERMOID CARCINOMA OF HARD PALATE (HCC): ICD-10-CM

## 2021-08-07 PROCEDURE — 70491 CT SOFT TISSUE NECK W/DYE: CPT

## 2021-08-07 PROCEDURE — G1004 CDSM NDSC: HCPCS

## 2021-08-07 RX ADMIN — IOHEXOL 85 ML: 350 INJECTION, SOLUTION INTRAVENOUS at 10:02

## 2021-08-11 ENCOUNTER — TELEPHONE (OUTPATIENT)
Dept: OTOLARYNGOLOGY | Facility: CLINIC | Age: 36
End: 2021-08-11

## 2021-08-11 NOTE — TELEPHONE ENCOUNTER
Left pt voicemail notify Ct neck with no changes, stable  To follow up as scheduled with Dr Micah Sigala in October

## 2021-12-12 ENCOUNTER — HOSPITAL ENCOUNTER (OUTPATIENT)
Dept: CT IMAGING | Facility: HOSPITAL | Age: 36
Discharge: HOME | End: 2021-12-12
Payer: COMMERCIAL

## 2021-12-12 DIAGNOSIS — C05.0 MUCOEPIDERMOID CARCINOMA OF HARD PALATE (HCC): ICD-10-CM

## 2021-12-12 PROCEDURE — G1004 CDSM NDSC: HCPCS

## 2021-12-12 PROCEDURE — 70491 CT SOFT TISSUE NECK W/DYE: CPT

## 2021-12-12 RX ADMIN — IOHEXOL 85 ML: 350 INJECTION, SOLUTION INTRAVENOUS at 08:34

## 2022-03-21 ENCOUNTER — NURSE TRIAGE (OUTPATIENT)
Dept: OTHER | Facility: OTHER | Age: 37
End: 2022-03-21

## 2022-03-21 ENCOUNTER — OFFICE VISIT (OUTPATIENT)
Dept: FAMILY MEDICINE CLINIC | Facility: CLINIC | Age: 37
End: 2022-03-21
Payer: COMMERCIAL

## 2022-03-21 VITALS
SYSTOLIC BLOOD PRESSURE: 122 MMHG | TEMPERATURE: 97.3 F | BODY MASS INDEX: 25.92 KG/M2 | DIASTOLIC BLOOD PRESSURE: 72 MMHG | WEIGHT: 191.38 LBS | HEIGHT: 72 IN

## 2022-03-21 DIAGNOSIS — J30.89 NON-SEASONAL ALLERGIC RHINITIS, UNSPECIFIED TRIGGER: ICD-10-CM

## 2022-03-21 DIAGNOSIS — J01.40 ACUTE NON-RECURRENT PANSINUSITIS: ICD-10-CM

## 2022-03-21 DIAGNOSIS — C05.0 MUCOEPIDERMOID CARCINOMA OF HARD PALATE (HCC): Primary | ICD-10-CM

## 2022-03-21 DIAGNOSIS — Z71.89 EDUCATED ABOUT COVID-19 VIRUS INFECTION: ICD-10-CM

## 2022-03-21 PROBLEM — J30.9 ALLERGIC RHINITIS: Status: ACTIVE | Noted: 2018-07-05

## 2022-03-21 PROBLEM — M54.50 LOW BACK PAIN: Status: RESOLVED | Noted: 2020-06-13 | Resolved: 2022-03-21

## 2022-03-21 PROBLEM — M85.88 MASS OF HARD PALATE: Status: RESOLVED | Noted: 2019-05-06 | Resolved: 2022-03-21

## 2022-03-21 PROBLEM — M77.12 LATERAL EPICONDYLITIS OF LEFT ELBOW: Status: RESOLVED | Noted: 2020-11-06 | Resolved: 2022-03-21

## 2022-03-21 PROCEDURE — 3725F SCREEN DEPRESSION PERFORMED: CPT | Performed by: FAMILY MEDICINE

## 2022-03-21 PROCEDURE — 1036F TOBACCO NON-USER: CPT | Performed by: FAMILY MEDICINE

## 2022-03-21 PROCEDURE — 3008F BODY MASS INDEX DOCD: CPT | Performed by: FAMILY MEDICINE

## 2022-03-21 PROCEDURE — 99214 OFFICE O/P EST MOD 30 MIN: CPT | Performed by: FAMILY MEDICINE

## 2022-03-21 RX ORDER — MONTELUKAST SODIUM 10 MG/1
10 TABLET ORAL
Qty: 30 TABLET | Refills: 5 | Status: SHIPPED | OUTPATIENT
Start: 2022-03-21 | End: 2022-07-22

## 2022-03-21 RX ORDER — SULFAMETHOXAZOLE AND TRIMETHOPRIM 800; 160 MG/1; MG/1
1 TABLET ORAL EVERY 12 HOURS SCHEDULED
Qty: 20 TABLET | Refills: 0 | Status: SHIPPED | OUTPATIENT
Start: 2022-03-21 | End: 2022-03-31

## 2022-03-21 RX ORDER — FLUTICASONE PROPIONATE 50 MCG
1 SPRAY, SUSPENSION (ML) NASAL DAILY
COMMUNITY

## 2022-03-21 NOTE — TELEPHONE ENCOUNTER
Reason for Disposition   [1] Sinus congestion (pressure, fullness) AND [2] present > 10 days    Answer Assessment - Initial Assessment Questions  1  LOCATION: "Where does it hurt?"       Denies any pain  2  ONSET: "When did the sinus pain start?"  (e g , hours, days)       Been about week; getting worse  3  SEVERITY: "How bad is the pain?"   (Scale 1-10; mild, moderate or severe)    - MILD (1-3): doesn't interfere with normal activities     - MODERATE (4-7): interferes with normal activities (e g , work or school) or awakens from sleep    - SEVERE (8-10): excruciating pain and patient unable to do any normal activities         N/A  4  RECURRENT SYMPTOM: "Have you ever had sinus problems before?" If Yes, ask: "When was the last time?" and "What happened that time?"       Has had it before; had been using Afrin  5  NASAL CONGESTION: "Is the nose blocked?" If Yes, ask: "Can you open it or must you breathe through the mouth?"      Nose very congested; breathing through mouth at night  6  NASAL DISCHARGE: "Do you have discharge from your nose?" If so ask, "What color?"     Yellow in color  7  FEVER: "Do you have a fever?" If Yes, ask: "What is it, how was it measured, and when did it start?"      Denies  8   OTHER SYMPTOMS: "Do you have any other symptoms?" (e g , sore throat, cough, earache, difficulty breathing)      Originally had a sore throat for 2 days, but went away, denies cough, sometimes feel SOB, only feels like that when really clogged and a lot of pressure in nose      Tested for Covid yesterday and was Covid negative    Protocols used: SINUS PAIN OR CONGESTION-ADULT-

## 2022-03-21 NOTE — PROGRESS NOTES
Assessment and Plan:    Problem List Items Addressed This Visit     Allergic rhinitis     Increasing symptoms  Patient restarted Zyrtec, Flonase, occasional use of Afrin  The reviewed about Afrin and addiction/rebound  Patient is also using Sudafed  Recommend add Singulair  Consider adding steroids if he cannot get in with ENT in the near future  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[facility] ) - Primary     Patient did have CT scan recently, which did not show any significant changes  Will ask that he see ENT, for the sinus infection issues, and consider CT of the sinuses  I did not order that as he did have CT scans recently in August and December for the 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  Other Visit Diagnoses     Acute non-recurrent pansinusitis        Recommend Bactrim DS  Also, will add Singulair for allergies  ENT stat referral as he has had repeated episodes of sinus and allergy like symptoms    Relevant Medications    sulfamethoxazole-trimethoprim (Bactrim DS) 800-160 mg per tablet    montelukast (SINGULAIR) 10 mg tablet    Other Relevant Orders    Ambulatory Referral to Otolaryngology    Educated about COVID-19 virus infection        Recommend vaccine  Diagnoses and all orders for this visit:    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[facility] )    Non-seasonal allergic rhinitis, unspecified trigger    Acute non-recurrent pansinusitis  Comments:  Recommend Bactrim DS  Also, will add Singulair for allergies  ENT stat referral as he has had repeated episodes of sinus and allergy like symptoms  Orders:  -     sulfamethoxazole-trimethoprim (Bactrim DS) 800-160 mg per tablet; Take 1 tablet by mouth every 12 (twelve) hours for 10 days  -     Ambulatory Referral to Otolaryngology; Future  -     montelukast (SINGULAIR) 10 mg tablet;  Take 1 tablet (10 mg total) by mouth daily at bedtime    Educated about COVID-19 virus infection  Comments:  Recommend vaccine  Other orders  -     Discontinue: ELDERBERRY PO; Take by mouth  -     fluticasone (FLONASE) 50 mcg/act nasal spray; 1 spray into each nostril daily              Subjective:      Patient ID: Deborah Pearce is a 39 y o  male  CC:    Chief Complaint   Patient presents with    Nasal Congestion     5 days, pt has been taking sudafed,afrin but has had little relief   mgb    Sore Throat     had sore throat the first 2 days but has now subsided  Pt states he had a neg covid test at home   mgb       HPI:    Patient is here to discuss chronic sinus symptoms  Significant amount nasal congestion  Recently, started several days ago  Currently using Sudafed, Flonase, Afrin  Minimal to no help  Denies any tooth pain  Just has pressure in the sinuses  No postnasal drip with this   some runny nose  Significant nasal discharge with blowing nose            The following portions of the patient's history were reviewed and updated as appropriate: allergies, current medications, past family history, past medical history, past social history, past surgical history and problem list       Review of Systems      Data to review:       Objective:    Vitals:    03/21/22 1103   BP: 122/72   BP Location: Left arm   Patient Position: Sitting   Cuff Size: Standard   Temp: (!) 97 3 °F (36 3 °C)   TempSrc: Temporal   Weight: 86 8 kg (191 lb 6 oz)   Height: 6' (1 829 m)        Physical Exam

## 2022-03-21 NOTE — ASSESSMENT & PLAN NOTE
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

## 2022-03-21 NOTE — PATIENT INSTRUCTIONS
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     Patient did have CT scan recently, which did not show any significant changes  Will ask that he see ENT, for the sinus infection issues, and consider CT of the sinuses  I did not order that as he did have CT scans recently in August and December for the 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  Other Visit Diagnoses     Acute non-recurrent pansinusitis        Recommend Bactrim DS  Also, will add Singulair for allergies  ENT stat referral as he has had repeated episodes of sinus and allergy like symptoms    Relevant Medications    sulfamethoxazole-trimethoprim (Bactrim DS) 800-160 mg per tablet    montelukast (SINGULAIR) 10 mg tablet    Other Relevant Orders    Ambulatory Referral to Otolaryngology    Educated about COVID-19 virus infection        Recommend vaccine  COVID 19 Instructions    Christie Keenan was advised to limit contact with others to essential tasks such as getting food, medications, and medical care  Proper handwashing reviewed, and Hand sanitzer when washing is not available  If the patient develops symptoms of COVID 19, the patient should call the office as soon as possible  For 1083-6937 Flu season, it is strongly recommended that Flu Vaccinations be obtained  Virtual Visits:  Karena: This works on smart phones (any phone with Internet browsing capability)  You should get a text message when the provider is ready to see you  Click on the link in the text message, and the call should start    You will need to type in your name, and allow camera and microphone access  This is HIPPA compliant, and secure  If you have not already done so, get immunized to COVID 19  We are committed to getting you vaccinated as soon as possible and will be closely following CDC and [facility]  guidelines as they are released and revised  Please refer to our COVID-19 vaccine webpage for the most up to date information on the vaccine and our distribution efforts  This site will also have the most up to date recommendations for COVID booster vaccine  Jasiel talavera    Call [phone] ([phone]), option 7    OUR NEW LOCATION:    [address], [address]  Fax: [phone]    Lab services and OB/GYN are at this location as well

## 2022-07-22 ENCOUNTER — OFFICE VISIT (OUTPATIENT)
Dept: FAMILY MEDICINE CLINIC | Facility: CLINIC | Age: 37
End: 2022-07-22
Payer: COMMERCIAL

## 2022-07-22 VITALS
HEART RATE: 53 BPM | SYSTOLIC BLOOD PRESSURE: 112 MMHG | BODY MASS INDEX: 25.68 KG/M2 | OXYGEN SATURATION: 98 % | HEIGHT: 72 IN | WEIGHT: 189.6 LBS | DIASTOLIC BLOOD PRESSURE: 78 MMHG

## 2022-07-22 DIAGNOSIS — Z13.29 SCREENING FOR THYROID DISORDER: ICD-10-CM

## 2022-07-22 DIAGNOSIS — E78.5 DYSLIPIDEMIA: ICD-10-CM

## 2022-07-22 DIAGNOSIS — F41.9 ANXIETY: Primary | ICD-10-CM

## 2022-07-22 DIAGNOSIS — Z13.1 SCREENING FOR DIABETES MELLITUS: ICD-10-CM

## 2022-07-22 PROCEDURE — 99214 OFFICE O/P EST MOD 30 MIN: CPT | Performed by: NURSE PRACTITIONER

## 2022-07-22 RX ORDER — HYDROXYZINE HYDROCHLORIDE 25 MG/1
25 TABLET, FILM COATED ORAL EVERY 6 HOURS PRN
Qty: 30 TABLET | Refills: 0 | Status: SHIPPED | OUTPATIENT
Start: 2022-07-22 | End: 2022-07-23

## 2022-07-22 NOTE — ASSESSMENT & PLAN NOTE
Patient was started on hydroxyzine 25 mg as needed every 6 hours to help with anxiety  I will have patient return to the office in 1 month to reassess his symptoms

## 2022-07-22 NOTE — PROGRESS NOTES
Assessment and Plan:    Problem List Items Addressed This Visit        Other    Dyslipidemia     Lipid panel was ordered to be completed prior to next office visit  Patient was advised to continue to limit fatty and fried foods in his diet  Relevant Orders    Lipid Panel with Direct LDL reflex    Anxiety - Primary     Patient was started on hydroxyzine 25 mg as needed every 6 hours to help with anxiety  I will have patient return to the office in 1 month to reassess his symptoms  Relevant Medications    hydrOXYzine HCL (ATARAX) 25 mg tablet      Other Visit Diagnoses     Screening for diabetes mellitus        Relevant Orders    Comprehensive metabolic panel    UA w Reflex to Microscopic w Reflex to Culture -Lab Collect    Screening for thyroid disorder        Relevant Orders    TSH, 3rd generation                 Diagnoses and all orders for this visit:    Anxiety  -     hydrOXYzine HCL (ATARAX) 25 mg tablet; Take 1 tablet (25 mg total) by mouth every 6 (six) hours as needed for anxiety    Dyslipidemia  -     Lipid Panel with Direct LDL reflex; Future    Screening for diabetes mellitus  -     Comprehensive metabolic panel; Future  -     UA w Reflex to Microscopic w Reflex to Culture -Lab Collect; Future    Screening for thyroid disorder  -     TSH, 3rd generation; Future            Subjective:      Patient ID: Jeison Nur is a 40 y o  male  CC:    Chief Complaint   Patient presents with    Anxiety     Pt states he has been having increased stress and anxiety from work  kw       HPI:    Anxiety:  Patient was previously prescribed BuSpar in the past but he is no longer taking this medication  The patient reports that for about a week recently he felt as though he was under a constant state of stress  He reports he did not have palpitations but he felt as though there was "stress" on his heart  He reports after about a week this feeling went away   He reports he has been working a lot more hours than usual at work and has also been drinking more caffeine than usual  He reports he is only having anxiety intermittently at this point  The patient also reports that he had episodes of heightened anxiety such as this in the past which resolved after a few days  Patient is not currently interested in speaking with a mental health counselor at this point  Dyslipidemia:  Patient's most recent lipid panel was completed in 2014 and did show slightly elevated LDL at that time  Patient is taking a daily fish oil supplement  The following portions of the patient's history were reviewed and updated as appropriate: allergies, current medications, past family history, past medical history, past social history, past surgical history and problem list       Review of Systems   Constitutional: Negative for chills and fever  HENT: Negative for ear pain and sore throat  Eyes: Negative for pain and visual disturbance  Respiratory: Negative for cough, chest tightness, shortness of breath and wheezing  Cardiovascular: Negative for chest pain, palpitations and leg swelling  Gastrointestinal: Negative for abdominal pain, constipation, diarrhea, nausea and vomiting  Endocrine: Negative for cold intolerance and heat intolerance  Genitourinary: Negative for decreased urine volume, dysuria and hematuria  Musculoskeletal: Negative for arthralgias, back pain and myalgias  Skin: Negative for color change and rash  Allergic/Immunologic: Positive for environmental allergies  Neurological: Negative for dizziness, seizures, syncope, weakness, light-headedness, numbness and headaches  Hematological: Negative for adenopathy  Psychiatric/Behavioral: Positive for sleep disturbance (intermittent insomnia )  Negative for confusion, self-injury and suicidal ideas  The patient is nervous/anxious (intermittent)  All other systems reviewed and are negative          Data to review:       Objective:    Vitals: 07/22/22 0830   BP: 112/78   BP Location: Right arm   Patient Position: Sitting   Pulse: (!) 53   SpO2: 98%   Weight: 86 kg (189 lb 9 6 oz)   Height: 6' (1 829 m)        Physical Exam  Vitals and nursing note reviewed  Constitutional:       General: He is not in acute distress  Appearance: Normal appearance  He is well-developed  He is not ill-appearing  HENT:      Head: Normocephalic and atraumatic  Eyes:      Conjunctiva/sclera: Conjunctivae normal    Cardiovascular:      Rate and Rhythm: Normal rate and regular rhythm  Pulses: Normal pulses  Carotid pulses are 2+ on the right side and 2+ on the left side  Radial pulses are 2+ on the right side and 2+ on the left side  Posterior tibial pulses are 2+ on the right side and 2+ on the left side  Heart sounds: Normal heart sounds  No murmur heard  Pulmonary:      Effort: Pulmonary effort is normal  No respiratory distress  Breath sounds: Normal breath sounds  No wheezing or rhonchi  Abdominal:      General: Abdomen is flat  Bowel sounds are normal  There is no distension  Palpations: Abdomen is soft  Tenderness: There is no abdominal tenderness  There is no guarding  Musculoskeletal:         General: Normal range of motion  Cervical back: Normal range of motion and neck supple  Right lower leg: No edema  Left lower leg: No edema  Skin:     General: Skin is warm and dry  Capillary Refill: Capillary refill takes less than 2 seconds  Neurological:      General: No focal deficit present  Mental Status: He is alert and oriented to person, place, and time  Psychiatric:         Mood and Affect: Mood normal          Behavior: Behavior normal          Thought Content: Thought content normal          Judgment: Judgment normal            BMI Counseling: Body mass index is 25 71 kg/m²   The BMI is above normal  Nutrition recommendations include decreasing portion sizes, encouraging healthy choices of fruits and vegetables, moderation in carbohydrate intake and increasing intake of lean protein  Exercise recommendations include exercising 3-5 times per week and obtaining a gym membership  No pharmacotherapy was ordered  Rationale for BMI follow-up plan is due to patient being overweight or obese

## 2022-07-22 NOTE — ASSESSMENT & PLAN NOTE
Lipid panel was ordered to be completed prior to next office visit  Patient was advised to continue to limit fatty and fried foods in his diet

## 2022-07-23 ENCOUNTER — TELEPHONE (OUTPATIENT)
Dept: FAMILY MEDICINE CLINIC | Facility: CLINIC | Age: 37
End: 2022-07-23

## 2022-07-23 DIAGNOSIS — F41.9 ANXIETY: Primary | ICD-10-CM

## 2022-07-23 RX ORDER — MIRTAZAPINE 7.5 MG/1
7.5 TABLET, FILM COATED ORAL
Qty: 30 TABLET | Refills: 5 | Status: SHIPPED | OUTPATIENT
Start: 2022-07-23 | End: 2022-08-08

## 2022-07-23 RX ORDER — BUSPIRONE HYDROCHLORIDE 5 MG/1
5 TABLET ORAL 2 TIMES DAILY
Qty: 60 TABLET | Refills: 5 | Status: SHIPPED | OUTPATIENT
Start: 2022-07-23 | End: 2022-07-23

## 2022-07-23 NOTE — TELEPHONE ENCOUNTER
PT ADVISED TO CALL OFFICE ASAP IF HE CANNOT TAKE MEDICATION  HE SAW [facility] YESTERDAY AND WAS PUT ON A NEW MEDICATION FOR ANXIETY  HE WAS ON IT BEFORE AND HIS HEART FELT LIKE IT WAS BEATING IRREGULARLY  THE SAME THING IS HAPPENING AGAIN  IT FEELS LIKE HIS HEART IS FLUTTERING  WHAT SHOULD HE DO? PLEASE ADVISE ASAP  THANK YOU

## 2022-07-23 NOTE — TELEPHONE ENCOUNTER
Discontinue hydroxyzine  I have sent BuSpar to the pharmacy take 5 mg twice daily    If symptomatology is not improved patient report to [facility] for evaluation of anxiety

## 2022-07-23 NOTE — TELEPHONE ENCOUNTER
Patient states he took Buspar 7 5mg BID in February and it did not work for him  Can he have something else sent to [facility] in [place]?

## 2022-07-23 NOTE — TELEPHONE ENCOUNTER
Patient reported he did not do well on the BusPar  Will not use that    Will use mirtazapine 7 5 mg at bedtime

## 2022-08-06 DIAGNOSIS — F41.9 ANXIETY: ICD-10-CM

## 2022-08-08 RX ORDER — MIRTAZAPINE 7.5 MG/1
7.5 TABLET, FILM COATED ORAL
Qty: 90 TABLET | Refills: 2 | Status: SHIPPED | OUTPATIENT
Start: 2022-08-08

## 2022-08-08 NOTE — TELEPHONE ENCOUNTER
Requested Prescriptions     Pending Prescriptions Disp Refills    mirtazapine (REMERON) 7 5 MG tablet [Pharmacy Med Name: MIRTAZAPINE 7 5 MG TABLET] 90 tablet 2     Sig: TAKE 1 TABLET (7 5 MG TOTAL) BY MOUTH DAILY AT BEDTIME     LOV 7/22/22 with MS, F/U 8/24/22 with DL, Labs active

## 2023-03-22 ENCOUNTER — OFFICE VISIT (OUTPATIENT)
Dept: FAMILY MEDICINE CLINIC | Facility: CLINIC | Age: 38
End: 2023-03-22

## 2023-03-22 VITALS
BODY MASS INDEX: 25.19 KG/M2 | WEIGHT: 186 LBS | SYSTOLIC BLOOD PRESSURE: 118 MMHG | HEART RATE: 74 BPM | TEMPERATURE: 98.1 F | DIASTOLIC BLOOD PRESSURE: 76 MMHG | HEIGHT: 72 IN

## 2023-03-22 DIAGNOSIS — Z00.00 ANNUAL PHYSICAL EXAM: Primary | ICD-10-CM

## 2023-03-22 DIAGNOSIS — R35.0 URINARY FREQUENCY: ICD-10-CM

## 2023-03-22 DIAGNOSIS — R53.83 OTHER FATIGUE: ICD-10-CM

## 2023-03-22 DIAGNOSIS — R68.82 DECREASED LIBIDO: ICD-10-CM

## 2023-03-22 DIAGNOSIS — R73.01 IFG (IMPAIRED FASTING GLUCOSE): ICD-10-CM

## 2023-03-22 DIAGNOSIS — E78.5 DYSLIPIDEMIA: ICD-10-CM
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
## 2023-03-22 NOTE — ASSESSMENT & PLAN NOTE
Labs were ordered to assess for any signs of prostate dysfunction or current infection  Ultrasound of the kidney and bladder with PVR was also ordered to assess for any nephrolithiasis or other urinary abnormalities which could explain his symptoms

## 2023-03-22 NOTE — PROGRESS NOTES
Name: Libia Crabtree      : 1985      MRN: [id number]  Encounter Provider: KELLI Og  Encounter Date: 3/22/2023   Encounter department: [place] [address]     1  Annual physical exam    2  Urinary frequency  Assessment & Plan:  Labs were ordered to assess for any signs of prostate dysfunction or current infection  Ultrasound of the kidney and bladder with PVR was also ordered to assess for any nephrolithiasis or other urinary abnormalities which could explain his symptoms  Orders:  -     CBC and differential; Future  -     UA w Reflex to Microscopic w Reflex to Culture -Lab Collect; Future; Expected date: 2023  -     PSA, total and free; Future  -     US kidney and bladder with pvr; Future; Expected date: 2023    3  Dyslipidemia  Assessment & Plan:  Lipid panel was ordered to be completed prior to next office visit  Patient was advised to continue to limit fatty and fried foods in his diet  Orders:  -     Lipid Panel with Direct LDL reflex; Future    4  IFG (impaired fasting glucose)  Assessment & Plan:  CMP and hemoglobin A1c were ordered to be completed prior to next office visit  Orders:  -     Comprehensive metabolic panel; Future  -     HEMOGLOBIN A1C W/ EAG ESTIMATION; Future    5  Other fatigue  Assessment & Plan:  Labs were ordered to assess for multiple different causes of fatigue and decreased libido  Orders:  -     Testosterone, free, total; Future  -     TSH, 3rd generation; Future  -     Vitamin D 25 hydroxy; Future  -     Lyme Antibody Profile with reflex to WB; Future  -     Iron Panel (Includes Ferritin, Iron Sat%, Iron, and TIBC); Future    6  Decreased libido  -     Testosterone, free, total; Future  -     TSH, 3rd generation; Future        Depression Screening and Follow-up Plan: Patient was screened for depression during today's encounter  They screened negative with a PHQ-2 score of 0          Subjective      Urinary concerns: Patient reports over the past year he has been having recurrent symptoms of weak urinary flow, frequent nocturia, and difficulty starting his stream   Patient denies any dysuria, urinary urgency, or incomplete bladder emptying  The patient does report he frequently drinks water throughout the day but does stop drinking water about 3 hours prior to bedtime but he will often still get up at least 1-2 times per night to void  Patient denies any current ED symptoms but does report he has been having increased fatigue recently and also feels he has been having decreased libido  Patient is in a monogamous relationship with his wife and denies any new sexual partners  Patient also reports that his father was recently diagnosed with prostate cancer  Dyslipidemia: Patient has not had a lipid panel completed in some time  Patient is currently taking a daily fish oil supplement  IFG: Patient's most recent CMP did show elevated glucose  Patient denies polydipsia or polyphagia  Review of Systems   Constitutional: Positive for fatigue  Negative for chills and fever  HENT: Negative for ear pain and sore throat  Eyes: Negative for pain and visual disturbance  Respiratory: Negative for cough, chest tightness, shortness of breath and wheezing  Cardiovascular: Negative for chest pain, palpitations and leg swelling  Gastrointestinal: Negative for abdominal pain, constipation, diarrhea, nausea and vomiting  Endocrine: Negative for cold intolerance and heat intolerance  Genitourinary: Positive for frequency  Negative for difficulty urinating, dysuria, hematuria and urgency  Difficulty starting stream, slow stream, frequent nocturia, decreased libido   Musculoskeletal: Negative for arthralgias and back pain  Skin: Negative for color change and rash  Allergic/Immunologic: Negative for environmental allergies     Neurological: Negative for dizziness, seizures, syncope, weakness, light-headedness, numbness and headaches  Hematological: Negative for adenopathy  Psychiatric/Behavioral: Negative for confusion  The patient is not nervous/anxious  All other systems reviewed and are negative  Current Outpatient Medications on File Prior to Visit   Medication Sig   • Omega-3 Fatty Acids (FISH OIL) 1,000 mg Take 1,000 mg by mouth daily   • Vitamin D, Cholecalciferol, 50 MCG (2000 UT) CAPS Take by mouth   • [DISCONTINUED] cetirizine (ZyrTEC) 10 mg tablet Take 10 mg by mouth daily after lunch    • [DISCONTINUED] fluticasone (FLONASE) 50 mcg/act nasal spray 1 spray into each nostril daily   • [DISCONTINUED] mirtazapine (REMERON) 7 5 MG tablet TAKE 1 TABLET (7 5 MG TOTAL) BY MOUTH DAILY AT BEDTIME       Objective     /76 (BP Location: Right arm, Patient Position: Sitting, Cuff Size: Adult)   Pulse 74   Temp 98 1 °F (36 7 °C) (Temporal)   Ht 6' (1 829 m)   Wt 84 4 kg (186 lb)   BMI 25 23 kg/m²     Physical Exam  Vitals and nursing note reviewed  Constitutional:       General: He is not in acute distress  Appearance: Normal appearance  He is not ill-appearing  HENT:      Head: Normocephalic  Eyes:      Conjunctiva/sclera: Conjunctivae normal    Cardiovascular:      Rate and Rhythm: Normal rate and regular rhythm  Pulses: Normal pulses  Carotid pulses are 2+ on the right side and 2+ on the left side  Radial pulses are 2+ on the right side and 2+ on the left side  Posterior tibial pulses are 2+ on the right side and 2+ on the left side  Heart sounds: Normal heart sounds  No murmur heard  Pulmonary:      Effort: Pulmonary effort is normal  No respiratory distress  Breath sounds: Normal breath sounds  No wheezing, rhonchi or rales  Abdominal:      General: Abdomen is flat  Bowel sounds are normal  There is no distension  Palpations: Abdomen is soft  Tenderness: There is no abdominal tenderness  There is no guarding  Genitourinary:     Comments: Patient refused prostate exam in the office today  Musculoskeletal:         General: Normal range of motion  Cervical back: Normal range of motion  Right lower leg: No edema  Left lower leg: No edema  Skin:     General: Skin is warm and dry  Capillary Refill: Capillary refill takes less than 2 seconds  Neurological:      General: No focal deficit present  Mental Status: He is alert and oriented to person, place, and time  Psychiatric:         Mood and Affect: Mood normal          Behavior: Behavior normal          Thought Content:  Thought content normal          Judgment: Judgment normal        KELLI Dangelo

## 2023-03-25 ENCOUNTER — APPOINTMENT (OUTPATIENT)
Dept: LAB | Facility: MEDICAL CENTER | Age: 38
End: 2023-03-25

## 2023-03-25 DIAGNOSIS — R68.82 DECREASED LIBIDO: ICD-10-CM

## 2023-03-25 DIAGNOSIS — R35.0 URINARY FREQUENCY: ICD-10-CM

## 2023-03-25 DIAGNOSIS — R73.01 IFG (IMPAIRED FASTING GLUCOSE): ICD-10-CM

## 2023-03-25 DIAGNOSIS — R53.83 OTHER FATIGUE: ICD-10-CM

## 2023-03-25 DIAGNOSIS — E78.5 DYSLIPIDEMIA: ICD-10-CM

## 2023-03-25 LAB
25(OH)D3 SERPL-MCNC: 25.8 NG/ML (ref 30–100)
ALBUMIN SERPL BCP-MCNC: 4.6 G/DL (ref 3.5–5)
ALP SERPL-CCNC: 49 U/L (ref 46–116)
ALT SERPL W P-5'-P-CCNC: 31 U/L (ref 12–78)
ANION GAP SERPL CALCULATED.3IONS-SCNC: 1 MMOL/L (ref 4–13)
AST SERPL W P-5'-P-CCNC: 17 U/L (ref 5–45)
B BURGDOR IGG+IGM SER-ACNC: 0.3 AI
BASOPHILS # BLD AUTO: 0.06 THOUSANDS/ÂΜL (ref 0–0.1)
BASOPHILS NFR BLD AUTO: 1 % (ref 0–1)
BILIRUB SERPL-MCNC: 0.94 MG/DL (ref 0.2–1)
BILIRUB UR QL STRIP: NEGATIVE
BUN SERPL-MCNC: 14 MG/DL (ref 5–25)
CALCIUM SERPL-MCNC: 9.5 MG/DL (ref 8.3–10.1)
CHLORIDE SERPL-SCNC: 106 MMOL/L (ref 96–108)
CHOLEST SERPL-MCNC: 202 MG/DL
CLARITY UR: CLEAR
CO2 SERPL-SCNC: 30 MMOL/L (ref 21–32)
COLOR UR: COLORLESS
CREAT SERPL-MCNC: 1.1 MG/DL (ref 0.6–1.3)
EOSINOPHIL # BLD AUTO: 0.1 THOUSAND/ÂΜL (ref 0–0.61)
EOSINOPHIL NFR BLD AUTO: 2 % (ref 0–6)
ERYTHROCYTE [DISTWIDTH] IN BLOOD BY AUTOMATED COUNT: 11.9 % (ref 11.6–15.1)
EST. AVERAGE GLUCOSE BLD GHB EST-MCNC: 103 MG/DL
FERRITIN SERPL-MCNC: 149 NG/ML (ref 8–388)
GFR SERPL CREATININE-BSD FRML MDRD: 84 ML/MIN/1.73SQ M
GLUCOSE P FAST SERPL-MCNC: 101 MG/DL (ref 65–99)
GLUCOSE UR STRIP-MCNC: NEGATIVE MG/DL
HBA1C MFR BLD: 5.2 %
HCT VFR BLD AUTO: 47.9 % (ref 36.5–49.3)
HDLC SERPL-MCNC: 59 MG/DL
HGB BLD-MCNC: 16.4 G/DL (ref 12–17)
HGB UR QL STRIP.AUTO: NEGATIVE
IMM GRANULOCYTES # BLD AUTO: 0.01 THOUSAND/UL (ref 0–0.2)
IMM GRANULOCYTES NFR BLD AUTO: 0 % (ref 0–2)
IRON SATN MFR SERPL: 31 % (ref 20–50)
IRON SERPL-MCNC: 125 UG/DL (ref 65–175)
KETONES UR STRIP-MCNC: NEGATIVE MG/DL
LDLC SERPL CALC-MCNC: 123 MG/DL (ref 0–100)
LEUKOCYTE ESTERASE UR QL STRIP: NEGATIVE
LYMPHOCYTES # BLD AUTO: 1.65 THOUSANDS/ÂΜL (ref 0.6–4.47)
LYMPHOCYTES NFR BLD AUTO: 38 % (ref 14–44)
MCH RBC QN AUTO: 30.4 PG (ref 26.8–34.3)
MCHC RBC AUTO-ENTMCNC: 34.2 G/DL (ref 31.4–37.4)
MCV RBC AUTO: 89 FL (ref 82–98)
MONOCYTES # BLD AUTO: 0.26 THOUSAND/ÂΜL (ref 0.17–1.22)
MONOCYTES NFR BLD AUTO: 6 % (ref 4–12)
NEUTROPHILS # BLD AUTO: 2.3 THOUSANDS/ÂΜL (ref 1.85–7.62)
NEUTS SEG NFR BLD AUTO: 53 % (ref 43–75)
NITRITE UR QL STRIP: NEGATIVE
NRBC BLD AUTO-RTO: 0 /100 WBCS
PH UR STRIP.AUTO: 7 [PH]
PLATELET # BLD AUTO: 201 THOUSANDS/UL (ref 149–390)
PMV BLD AUTO: 11.3 FL (ref 8.9–12.7)
POTASSIUM SERPL-SCNC: 4.2 MMOL/L (ref 3.5–5.3)
PROT SERPL-MCNC: 7.9 G/DL (ref 6.4–8.4)
PROT UR STRIP-MCNC: NEGATIVE MG/DL
RBC # BLD AUTO: 5.39 MILLION/UL (ref 3.88–5.62)
SODIUM SERPL-SCNC: 137 MMOL/L (ref 135–147)
SP GR UR STRIP.AUTO: 1.01 (ref 1–1.03)
TIBC SERPL-MCNC: 404 UG/DL (ref 250–450)
TRIGL SERPL-MCNC: 101 MG/DL
TSH SERPL DL<=0.05 MIU/L-ACNC: 1.37 UIU/ML (ref 0.45–4.5)
UROBILINOGEN UR STRIP-ACNC: <2 MG/DL
WBC # BLD AUTO: 4.38 THOUSAND/UL (ref 4.31–10.16)

## 2023-03-28 LAB
PSA FREE MFR SERPL: 25.7 %
PSA FREE SERPL-MCNC: 0.18 NG/ML
PSA SERPL-MCNC: 0.7 NG/ML (ref 0–4)

## 2023-03-29 ENCOUNTER — HOSPITAL ENCOUNTER (OUTPATIENT)
Dept: ULTRASOUND IMAGING | Facility: HOSPITAL | Age: 38
Discharge: HOME | End: 2023-03-29

## 2023-03-29 DIAGNOSIS — R35.0 URINARY FREQUENCY: ICD-10-CM

## 2023-03-30 LAB
TESTOST FREE SERPL-MCNC: 6.8 PG/ML (ref 8.7–25.1)
TESTOST SERPL-MCNC: 317 NG/DL (ref 264–916)

## 2023-03-31 ENCOUNTER — TELEPHONE (OUTPATIENT)
Dept: UROLOGY | Facility: AMBULATORY SURGERY CENTER | Age: 38
End: 2023-03-31

## 2023-03-31 DIAGNOSIS — R79.89 LOW TESTOSTERONE: Primary | ICD-10-CM

## 2023-03-31 NOTE — TELEPHONE ENCOUNTER
Please Triage  New Patient    What is the reason for the patient’s appointment? R79 89 (ICD-10-CM) - Low testosterone   Pt states that he has frequent urination  He states that sometimes when he is done urinating he feels like he has to go again     What office location does the patient prefer? [place]     Imaging/Lab Results: yes     Do we accept the patient's insurance or is the patient Self-Pay? Insurance Provider: [employer]   Plan Type/Number:  Member ID#: Has the patient had any previous Urologist(s)?  no    Have patient records been requested? If not are records showing in Epic: no    Has the patient had any outside testing done? no    Does the patient have a personal history of cancer?  no

## 2023-04-19 PROBLEM — Z80.42 FAMILY HISTORY OF PROSTATE CANCER IN FATHER: Status: ACTIVE | Noted: 2023-04-19

## 2023-04-24 ENCOUNTER — LAB (OUTPATIENT)
Dept: LAB | Facility: MEDICAL CENTER | Age: 38
End: 2023-04-24

## 2023-04-24 DIAGNOSIS — R79.89 LOW TESTOSTERONE: ICD-10-CM

## 2023-04-24 LAB
FSH SERPL-ACNC: 1.5 MIU/ML (ref 0.7–10.8)
LH SERPL-ACNC: 2.2 MIU/ML (ref 1.2–10.6)
PROLACTIN SERPL-MCNC: 3.4 NG/ML (ref 2.5–17.4)

## 2023-04-25 LAB
TESTOST FREE SERPL-MCNC: 10.1 PG/ML (ref 8.7–25.1)
TESTOST SERPL-MCNC: 429 NG/DL (ref 264–916)

## 2023-04-27 ENCOUNTER — OFFICE VISIT (OUTPATIENT)
Dept: FAMILY MEDICINE CLINIC | Facility: CLINIC | Age: 38
End: 2023-04-27

## 2023-04-27 VITALS
HEART RATE: 88 BPM | BODY MASS INDEX: 24.92 KG/M2 | OXYGEN SATURATION: 98 % | HEIGHT: 72 IN | SYSTOLIC BLOOD PRESSURE: 110 MMHG | RESPIRATION RATE: 16 BRPM | WEIGHT: 184 LBS | DIASTOLIC BLOOD PRESSURE: 78 MMHG

## 2023-04-27 DIAGNOSIS — R22.2 CHEST WALL MASS: Primary | ICD-10-CM

## 2023-04-27 DIAGNOSIS — F41.9 ANXIETY: ICD-10-CM

## 2023-04-27 RX ORDER — TRAZODONE HYDROCHLORIDE 50 MG/1
50 TABLET ORAL
Qty: 30 TABLET | Refills: 2 | Status: SHIPPED | OUTPATIENT
Start: 2023-04-27

## 2023-04-27 NOTE — ASSESSMENT & PLAN NOTE
She has minimal swelling on the left sternal border, at approximately the second rib  Question if it is costochondritis, though the symptoms that he is describing are not related to that  Check rib series, and then if no findings, could consider ultrasound  Patient would like to avoid radiation as much as possible due to prior history of mucoepidermoid carcinoma of the hard palate

## 2023-04-27 NOTE — ASSESSMENT & PLAN NOTE
Patient does have some panic episodes  Has been having anxiety and panic for a bit over the last several years  Currently, due to outside stressors, it seems to be a bit worse  Trial of trazodone at at bedtime  He does seem to recall nasal congestion after he took trazodone previously, but I would recommend that he try that, it is not a common side effect if at all

## 2023-04-27 NOTE — PROGRESS NOTES
Name: Elmer Durham      : 1985      MRN: [id number]  Encounter Provider: Leni Villalta MD  Encounter Date: 2023   Encounter department: [facility]ARE    Assessment & Plan     1  Chest wall mass  Assessment & Plan:  She has minimal swelling on the left sternal border, at approximately the second rib  Question if it is costochondritis, though the symptoms that he is describing are not related to that  Check rib series, and then if no findings, could consider ultrasound  Patient would like to avoid radiation as much as possible due to prior history of mucoepidermoid carcinoma of the hard palate  Orders:  -     XR ribs bilateral 4+ vw w pa chest; Future; Expected date: 2023    2  Anxiety  Assessment & Plan:  Patient does have some panic episodes  Has been having anxiety and panic for a bit over the last several years  Currently, due to outside stressors, it seems to be a bit worse  Trial of trazodone at at bedtime  He does seem to recall nasal congestion after he took trazodone previously, but I would recommend that he try that, it is not a common side effect if at all  Orders:  -     traZODone (DESYREL) 50 mg tablet; Take 1 tablet (50 mg total) by mouth daily at bedtime           Subjective      Chief Complaint   Patient presents with   • Mass     Per pt chest lump left side of chest        Here for stress and anxiety  Has had increase last week  Poor sleep over weekend  Still feels on edge  He wondered about treatment options including for sleep  Some panic at times  Is external, ie, has to talk with  frequently  When the anxiety increased, he was very concerned about the area on the left chest   Minor discomfort with this  Review of Systems   Constitutional: Negative  HENT: Negative  Respiratory: Negative  Cardiovascular: Negative  Gastrointestinal: Negative  Musculoskeletal: Negative           Per HPI   Skin:        Per HPI Neurological: Negative  Hematological: Negative  Psychiatric/Behavioral: Positive for agitation, dysphoric mood and sleep disturbance  Negative for suicidal ideas  The patient is nervous/anxious  Current Outpatient Medications on File Prior to Visit   Medication Sig   • magnesium 30 MG tablet Take 30 mg by mouth 2 (two) times a day   • Omega-3 Fatty Acids (FISH OIL) 1,000 mg Take 1,000 mg by mouth daily   • Vitamin D, Cholecalciferol, 50 MCG (2000 UT) CAPS Take by mouth       Objective     /78 (BP Location: Right arm, Patient Position: Sitting, Cuff Size: Standard)   Pulse 88   Resp 16   Ht 6' (1 829 m)   Wt 83 5 kg (184 lb)   SpO2 98%   BMI 24 95 kg/m²     Physical Exam  Vitals and nursing note reviewed  Constitutional:       Appearance: He is well-developed  HENT:      Head: Normocephalic and atraumatic  Cardiovascular:      Rate and Rhythm: Normal rate and regular rhythm  Pulses:           Carotid pulses are 2+ on the right side and 2+ on the left side  Heart sounds: Normal heart sounds  No murmur heard  No friction rub  No gallop  Pulmonary:      Effort: Pulmonary effort is normal  No respiratory distress  Breath sounds: Normal breath sounds  No wheezing or rales  Chest:       Musculoskeletal:      Cervical back: Normal range of motion and neck supple         David Hurtado MD

## 2023-04-27 NOTE — PATIENT INSTRUCTIONS
1  Chest wall mass  Assessment & Plan:  She has minimal swelling on the left sternal border, at approximately the second rib  Question if it is costochondritis, though the symptoms that he is describing are not related to that  Check rib series, and then if no findings, could consider ultrasound  Patient would like to avoid radiation as much as possible due to prior history of mucoepidermoid carcinoma of the hard palate  Orders:  -     XR ribs bilateral 4+ vw w pa chest; Future; Expected date: 04/27/2023    2  Anxiety  Assessment & Plan:  Patient does have some panic episodes  Has been having anxiety and panic for a bit over the last several years  Currently, due to outside stressors, it seems to be a bit worse  Trial of trazodone at at bedtime  He does seem to recall nasal congestion after he took trazodone previously, but I would recommend that he try that, it is not a common side effect if at all  Orders:  -     traZODone (DESYREL) 50 mg tablet; Take 1 tablet (50 mg total) by mouth daily at bedtime        COVID 19 Instructions    Melanie Kay was advised to limit contact with others to essential tasks such as getting food, medications, and medical care  Proper handwashing reviewed, and Hand sanitzer when washing is not available  If the patient develops symptoms of COVID 19, the patient should call the office as soon as possible  For 3489-0573 Flu season, it is strongly recommended that Flu Vaccinations be obtained  Virtual Visits:  Karena: This works on smart phones (any phone with Internet browsing capability)  You should get a text message when the provider is ready to see you  Click on the link in the text message, and the call should start  You will need to type in your name, and allow camera and microphone access  This is HIPPA compliant, and secure  If you have not already done so, get immunized to COVID 19        We are committed to getting you vaccinated as soon as possible and will be closely following CDC and [facility]  guidelines as they are released and revised  Please refer to our COVID-19 vaccine webpage for the most up to date information on the vaccine and our distribution efforts  This site will also have the most up to date recommendations for COVID booster vaccine  Jasiel talavera    Call [phone]), option 7    OUR NEW LOCATION:    [address], [facility] [address] [place], [place], [address]  Fax: [phone]    Lab services and OB/GYN are at this location as well

## 2023-04-28 ENCOUNTER — APPOINTMENT (OUTPATIENT)
Dept: RADIOLOGY | Facility: MEDICAL CENTER | Age: 38
End: 2023-04-28

## 2023-04-28 DIAGNOSIS — R22.2 CHEST WALL MASS: ICD-10-CM

## 2023-04-30 LAB
SHBG SERPL-SCNC: 33.7 NMOL/L
TESTOST SERPL-MCNC: 445 NG/DL
TESTOSTERONE.FREE+WB MFR SERPL: 40.9 %
TESTOSTERONE.FREE+WB SERPL-MCNC: 182 NG/DL

## 2023-05-04 ENCOUNTER — TELEPHONE (OUTPATIENT)
Dept: FAMILY MEDICINE CLINIC | Facility: CLINIC | Age: 38
End: 2023-05-04

## 2023-05-04 DIAGNOSIS — J30.89 NON-SEASONAL ALLERGIC RHINITIS, UNSPECIFIED TRIGGER: Primary | ICD-10-CM

## 2023-05-04 RX ORDER — FLUTICASONE PROPIONATE 50 MCG
1 SPRAY, SUSPENSION (ML) NASAL DAILY
Qty: 11.1 ML | Refills: 3 | Status: SHIPPED | OUTPATIENT
Start: 2023-05-04

## 2023-05-04 NOTE — TELEPHONE ENCOUNTER
Pt called in to ask if he should follow up with an appointment or just wait until he should receive a call regarding his recent x-ray appointment  He is worried about what his next step should be  He is also concerned about a prescribed medicine ( traZODone DESYREL) and he mentioned that he does get side effects from taking it  He gets severe congestion in his sinus  The best phone number to reach pt is [phone]  He also mentioned that if he doesn't answer could he please get a voicemail just answering whether or not he should schedule an appointment

## 2023-05-04 NOTE — TELEPHONE ENCOUNTER
Pt will like something for congestion   Call pt LVM x ray results are normal if any questions or concerns contact the office

## 2023-05-09 DIAGNOSIS — F41.9 ANXIETY: Primary | ICD-10-CM

## 2023-05-09 DIAGNOSIS — G47.09 OTHER INSOMNIA: ICD-10-CM

## 2023-05-09 RX ORDER — LORAZEPAM 0.5 MG/1
0.5 TABLET ORAL 2 TIMES DAILY PRN
Qty: 60 TABLET | Refills: 0 | Status: SHIPPED | OUTPATIENT
Start: 2023-05-09

## 2023-05-10 DIAGNOSIS — J30.89 NON-SEASONAL ALLERGIC RHINITIS, UNSPECIFIED TRIGGER: ICD-10-CM

## 2023-05-10 RX ORDER — FLUTICASONE PROPIONATE 50 MCG
SPRAY, SUSPENSION (ML) NASAL
Qty: 24 ML | Refills: 3 | Status: SHIPPED | OUTPATIENT
Start: 2023-05-10

## 2023-05-30 DIAGNOSIS — R22.2 CHEST WALL MASS: Primary | ICD-10-CM

## 2023-06-13 ENCOUNTER — HOSPITAL ENCOUNTER (OUTPATIENT)
Dept: ULTRASOUND IMAGING | Facility: HOSPITAL | Age: 38
Discharge: HOME | End: 2023-06-13
Payer: COMMERCIAL

## 2023-06-13 DIAGNOSIS — R22.2 MASS OF SKIN OF CHEST: ICD-10-CM

## 2023-06-13 PROCEDURE — 76705 ECHO EXAM OF ABDOMEN: CPT

## 2023-10-25 DIAGNOSIS — J30.89 NON-SEASONAL ALLERGIC RHINITIS, UNSPECIFIED TRIGGER: ICD-10-CM

## 2023-10-25 RX ORDER — FLUTICASONE PROPIONATE 50 MCG
SPRAY, SUSPENSION (ML) NASAL
Qty: 24 ML | Refills: 0 | Status: SHIPPED | OUTPATIENT
Start: 2023-10-25

## 2023-11-08 DIAGNOSIS — J30.89 NON-SEASONAL ALLERGIC RHINITIS, UNSPECIFIED TRIGGER: ICD-10-CM

## 2023-11-08 RX ORDER — FLUTICASONE PROPIONATE 50 MCG
SPRAY, SUSPENSION (ML) NASAL
Qty: 48 ML | Refills: 1 | Status: SHIPPED | OUTPATIENT
Start: 2023-11-08

## 2024-02-22 ENCOUNTER — OFFICE VISIT (OUTPATIENT)
Dept: FAMILY MEDICINE CLINIC | Facility: CLINIC | Age: 39
End: 2024-02-22
Payer: COMMERCIAL

## 2024-02-22 VITALS
WEIGHT: 190 LBS | SYSTOLIC BLOOD PRESSURE: 140 MMHG | BODY MASS INDEX: 25.73 KG/M2 | OXYGEN SATURATION: 98 % | HEIGHT: 72 IN | DIASTOLIC BLOOD PRESSURE: 80 MMHG | HEART RATE: 65 BPM

## 2024-02-22 DIAGNOSIS — J30.89 NON-SEASONAL ALLERGIC RHINITIS, UNSPECIFIED TRIGGER: ICD-10-CM

## 2024-02-22 DIAGNOSIS — Z20.818 EXPOSURE TO MRSA: Primary | ICD-10-CM

## 2024-02-22 PROCEDURE — 99214 OFFICE O/P EST MOD 30 MIN: CPT | Performed by: PHYSICIAN ASSISTANT

## 2024-02-22 NOTE — PATIENT INSTRUCTIONS
Assessment/plan:  1.  MRSA exposure-patient states his infant was diagnosed with MRSA infection and his wife has rash on the chest which has been open.  Will swab the nares for MRSA culture and send to [facility].  Results to follow.  Consider Bactroban treatment and oral antibiotic if necessary.  2.  Eczema-patient has a few small bumps on his lateral right hip more consistent with eczema however if it does continue to spread would consider the diagnosis of scabies with multiple family members having itching rash.  3.  This-stable with Flonase as necessary.  No medication changes.

## 2024-02-22 NOTE — PROGRESS NOTES
Name: Grant Noguera      : 1985      MRN: [id number]  Encounter Provider: Silvino Giron PA-C  Encounter Date: 2024   Encounter department: [facility]    Assessment & Plan     Patient Instructions   Assessment/plan:  1.  MRSA exposure-patient states his infant was diagnosed with MRSA infection and his wife has rash on the chest which has been open.  Will swab the nares for MRSA culture and send to [facility] lab.  Results to follow.  Consider Bactroban treatment and oral antibiotic if necessary.  2.  Eczema-patient has a few small bumps on his lateral right hip more consistent with eczema however if it does continue to spread would consider the diagnosis of scabies with multiple family members having itching rash.  3.  This-stable with Flonase as necessary.  No medication changes.    1. Exposure to MRSA  -     MRSA culture; Future  -     MRSA culture    2. Non-seasonal allergic rhinitis, unspecified trigger           Subjective      HPI: This is a 38-year-old gentleman that presents to the office with concerns over possible exposure to MRSA.  His infant was diagnosed with MRSA recently and his wife has been battling some skin infection with open sores on her chest.  She has been seeing dermatologist however patient has been exposed to her quite frequently.  He did develop a few tiny little bumps on his right hip but they seem to itch and they are not oozing or open at all.  He is not having any fevers or chills or other signs of systemic infection.      Review of Systems   Constitutional:  Negative for fever.   HENT:  Negative for congestion.    Respiratory:  Negative for cough, chest tightness and shortness of breath.    Cardiovascular:  Negative for chest pain.   Musculoskeletal:  Negative for arthralgias.   Skin:  Positive for rash.        There is a very small patch of slightly elevated micropapules on the right hip which is approximately 3 cm x 4 cm.  There is no skin opening or  purulence present.       Current Outpatient Medications on File Prior to Visit   Medication Sig   • fluticasone (FLONASE) 50 mcg/act nasal spray SPRAY 1 SPRAY INTO EACH NOSTRIL EVERY DAY   • LORazepam (Ativan) 0.5 mg tablet Take 1 tablet (0.5 mg total) by mouth 2 (two) times a day as needed for anxiety (Insomnia)   • magnesium 30 MG tablet Take 30 mg by mouth 2 (two) times a day   • Omega-3 Fatty Acids (FISH OIL) 1,000 mg Take 1,000 mg by mouth daily   • Vitamin D, Cholecalciferol, 50 MCG (2000 UT) CAPS Take by mouth       Objective     /80 (BP Location: Left arm, Patient Position: Sitting, Cuff Size: Adult)   Pulse 65   Ht 6' (1.829 m)   Wt 86.2 kg (190 lb)   SpO2 98%   BMI 25.77 kg/m²     Physical Exam  Constitutional:       Appearance: He is well-developed.   HENT:      Head: Normocephalic.   Cardiovascular:      Rate and Rhythm: Normal rate and regular rhythm.   Pulmonary:      Effort: Pulmonary effort is normal. No respiratory distress.      Breath sounds: Normal breath sounds.   Abdominal:      Palpations: Abdomen is soft.   Musculoskeletal:         General: Normal range of motion.   Skin:     Comments: There is a very small patch of slightly elevated micropapules on the right hip which is approximately 3 cm x 4 cm.  There is no skin opening or purulence present.   Neurological:      Mental Status: He is alert and oriented to person, place, and time.       Silvino Giron PA-C

## 2024-02-26 ENCOUNTER — TELEPHONE (OUTPATIENT)
Age: 39
End: 2024-02-26

## 2024-02-26 DIAGNOSIS — Z20.7 SCABIES EXPOSURE: Primary | ICD-10-CM

## 2024-02-26 NOTE — TELEPHONE ENCOUNTER
Permethrin lotion was ordered to be used head to toe and applied even under fingernails.  Patient should leave the lotion on for about 8 hours and then wash off.

## 2024-02-26 NOTE — TELEPHONE ENCOUNTER
Received call from patient stating that his wife was diagnosed with scabies last week and he is requesting that a prescription be called into his pharmacy for scabies. He was seen in the office on 2/22.    Please advise.

## 2024-03-06 ENCOUNTER — TELEPHONE (OUTPATIENT)
Age: 39
End: 2024-03-06

## 2024-03-06 ENCOUNTER — NURSE TRIAGE (OUTPATIENT)
Age: 39
End: 2024-03-06

## 2024-03-06 NOTE — TELEPHONE ENCOUNTER
Let patient know [facility] out of the office.  If the patient wishes he may see another provider this week

## 2024-03-06 NOTE — TELEPHONE ENCOUNTER
Pt states he is still experiencing symptoms of the scabies and is asking if an oral medication can be sent for him and his wife or if they should set up another follow up appt to be seen.

## 2024-03-06 NOTE — TELEPHONE ENCOUNTER
"Patient called into the office to report that his wife has scabies on 2/26/24. Patient was seen in the office on 2/22/24 with a raised rash on his R hip. Patient was asking for treatment by phone. I did set up an appointment for the patient to be seen on 3/7/24 by Dr. Pereyra. Patient was also swabbed for MRSA on 2/22/24 as his child was diagnosed with MRSA. Please review visit notes and labs from 2/22/24.  Answer Assessment - Initial Assessment Questions  1. DIAGNOSIS CONFIRMATION: \"Who diagnosed your scabies?\" \"When?\"       Spouse was diagnosed with scabies on 2/26/24  2. ONSET:  \"When did the scabies rash begin?\"      2/22/24  3. LOCATION: \"Where do you have the scabies rash?\"      R hip  3. SYMPTOMS: \"What symptoms are you most concerned about today?\"        rash  4. ITCHING: \"How bad is the itching?        - MILD - doesn't interfere with normal activities        - MODERATE-SEVERE: interferes with work, school, sleep, or other activities       moderate  5. INFECTION: \"Does rash look infected?\" (e.g., spreading redness, tenderness, pus)       Spreading raSH  6. TREATMENT: \"What medicine(s) were you prescribed?\" \"What's happened since you took or applied the medicine?\"      NO TREATMENT  7. CLOSE CONTACTS: \"Does any person who lives with you or has had close contact with you have itching?\" If Yes, ask: \"Was the person(s) treated for scabies?\"      YES, SPOUSE HAS SCABIES    Protocols used: Scabies Follow-up Call-ADULT-    "

## 2024-03-07 ENCOUNTER — TELEPHONE (OUTPATIENT)
Age: 39
End: 2024-03-07

## 2024-03-07 ENCOUNTER — OFFICE VISIT (OUTPATIENT)
Dept: FAMILY MEDICINE CLINIC | Facility: CLINIC | Age: 39
End: 2024-03-07
Payer: COMMERCIAL

## 2024-03-07 VITALS
DIASTOLIC BLOOD PRESSURE: 70 MMHG | WEIGHT: 186 LBS | HEART RATE: 78 BPM | OXYGEN SATURATION: 97 % | HEIGHT: 72 IN | SYSTOLIC BLOOD PRESSURE: 118 MMHG | BODY MASS INDEX: 25.19 KG/M2

## 2024-03-07 DIAGNOSIS — Z20.7 SCABIES EXPOSURE: Primary | ICD-10-CM

## 2024-03-07 PROCEDURE — 99213 OFFICE O/P EST LOW 20 MIN: CPT | Performed by: FAMILY MEDICINE

## 2024-03-07 RX ORDER — IVERMECTIN 3 MG/1
200 TABLET ORAL ONCE
Qty: 6 TABLET | Refills: 0 | Status: SHIPPED | OUTPATIENT
Start: 2024-03-07 | End: 2024-03-07

## 2024-03-07 NOTE — TELEPHONE ENCOUNTER
Patient calling for an update on his Rx: Ivermectin dose.    Pharmacist informed him It should be written as 5.5 tablets at 16.5 mg instead at 5.5 16,500 mcg.    Please review and advice  Thank you

## 2024-03-07 NOTE — PATIENT INSTRUCTIONS
1. Scabies exposure  Comments:  Recurring.  Classic locations.  One-time ivermectin, as he previously did have permethrin.  Orders:  -     ivermectin (STROMECTOL) 3 MG TABS; Take 5.5 tablets (16,500 mcg total) by mouth once for 1 dose

## 2024-03-07 NOTE — TELEPHONE ENCOUNTER
Patient calling saying that pharmacy states there is a typo in Ivermectin order, written in mcg and mg. Per pharmacy has sent out request to office to changed dose. Please call back patient when new rx is sent

## 2024-03-07 NOTE — PROGRESS NOTES
Name: Grant Noguera      : 1985      MRN: [id number]  Encounter Provider: Mark Pereyra MD  Encounter Date: 3/7/2024   Encounter department: [facility]    Assessment & Plan     1. Scabies exposure  Comments:  Recurring.  Classic locations.  One-time ivermectin, as he previously did have permethrin.  Orders:  -     ivermectin (STROMECTOL) 3 MG TABS; Take 5.5 tablets (16,500 mcg total) by mouth once for 1 dose           Subjective      Chief Complaint   Patient presents with   • Follow-up     Patient present today for a follow up on scabies, per pt he just finished a cream and he is still having the rash. He will like an oral medication.       Patient was here recently for scabies.  Unfortunately, he and his family have this.  Wife was treated at a different time, and now has more problems, i.e. recurring lesions.  He has the same.  Patient's wife had been to dermatology, and has biopsy-proven scabies.  Unfortunately, we do not have any notes for that.          Review of Systems   Constitutional: Negative.    HENT: Negative.     Respiratory: Negative.     Cardiovascular: Negative.    Gastrointestinal: Negative.    Musculoskeletal: Negative.    Skin:  Positive for rash.       Current Outpatient Medications on File Prior to Visit   Medication Sig   • fluticasone (FLONASE) 50 mcg/act nasal spray SPRAY 1 SPRAY INTO EACH NOSTRIL EVERY DAY   • magnesium 30 MG tablet Take 30 mg by mouth 2 (two) times a day   • Omega-3 Fatty Acids (FISH OIL) 1,000 mg Take 1,000 mg by mouth daily   • Vitamin D, Cholecalciferol, 50 MCG (2000 UT) CAPS Take by mouth   • LORazepam (Ativan) 0.5 mg tablet Take 1 tablet (0.5 mg total) by mouth 2 (two) times a day as needed for anxiety (Insomnia) (Patient not taking: Reported on 3/7/2024)       Objective     /70 (BP Location: Left arm, Patient Position: Sitting, Cuff Size: Large)   Pulse 78   Ht 6' (1.829 m)   Wt 84.4 kg (186 lb)   SpO2 97%   BMI  25.23 kg/m²     Physical Exam  Vitals and nursing note reviewed.   Constitutional:       Appearance: Normal appearance.   Skin:     Comments: Some small papulles, some excoriation.  Some hives.   Neurological:      Mental Status: He is alert.       Mark Pereyra MD

## 2024-03-22 ENCOUNTER — TELEPHONE (OUTPATIENT)
Age: 39
End: 2024-03-22

## 2024-03-22 DIAGNOSIS — B86 SCABIES: Primary | ICD-10-CM

## 2024-03-22 NOTE — TELEPHONE ENCOUNTER
Spoke with patient he is looking for a referral to infectious disease for scabies. He was seen in the office by you prior for this reason. He is looking to go to Infection Disease on [address] [facility], [place] [address]. Ambulatory referral generated in chart. Please advise.

## 2024-03-22 NOTE — PROGRESS NOTES
Please advise referral for scabies. Pt prefers to go to [address] [facility], [place] [address]

## 2024-03-22 NOTE — TELEPHONE ENCOUNTER
Patient called stating he had received a call back, spoke with office not sure on who gave patient a call back.  Office stated that it could've been possible provider medical assistant but not available at the moment. Patient would like for office to give spouse a call back not patient.

## 2024-03-22 NOTE — TELEPHONE ENCOUNTER
Patient requested an ambulatory referral for infectious disease due to scabies.  Patient prefers to go to [facility], [address] [place] [address], [place] [address].  Please return patients call when the referral has been placed.

## 2024-03-27 ENCOUNTER — TELEPHONE (OUTPATIENT)
Dept: FAMILY MEDICINE CLINIC | Facility: CLINIC | Age: 39
End: 2024-03-27

## 2024-03-27 DIAGNOSIS — B86 SCABIES: Primary | ICD-10-CM

## 2024-03-27 RX ORDER — IVERMECTIN 3 MG/1
200 TABLET ORAL ONCE
Qty: 6 TABLET | Refills: 0 | Status: SHIPPED | OUTPATIENT
Start: 2024-03-27 | End: 2024-03-27

## 2024-03-27 NOTE — TELEPHONE ENCOUNTER
Please make patient aware that I was made aware by Dr. Mota that she had seen the patient's wife earlier today and she is treating her for scabies.  I also ordered repeat treatment with ivermectin for the patient for treatment of acute scabies.

## 2024-04-03 ENCOUNTER — TELEPHONE (OUTPATIENT)
Age: 39
End: 2024-04-03

## 2024-04-03 DIAGNOSIS — B86 SCABIES INFESTATION: Primary | ICD-10-CM

## 2024-04-03 RX ORDER — IVERMECTIN 3 MG/1
200 TABLET ORAL ONCE
Qty: 6 TABLET | Refills: 0 | Status: SHIPPED | OUTPATIENT
Start: 2024-04-03 | End: 2024-04-03

## 2024-04-03 NOTE — TELEPHONE ENCOUNTER
Please advise. Patients wife was given another script for Ivermectin 3mg yesterday from Dr. Mota for scabies.

## 2024-04-03 NOTE — TELEPHONE ENCOUNTER
Patient called stating that he needs another refill on Ivermectin 3 mg and Permethrin 1 % due to it not working and would like to try again. Patient states that his wife and him need to take medications at the same time in order for it to work and go away.

## 2024-05-03 PROBLEM — B86 SCABIES INFESTATION: Status: RESOLVED | Noted: 2024-04-03 | Resolved: 2024-05-03

## 2024-07-25 ENCOUNTER — TELEPHONE (OUTPATIENT)
Age: 39
End: 2024-07-25

## 2024-07-25 NOTE — TELEPHONE ENCOUNTER
Patient called, questions if something can be prescribed patient tested positive for COVID, and doesn't want to expose practice by an in practice appointment. Advised patient of a option for a possible virtual appointment. Upon chart review/appointments , confirmed provider availability. Patient request a callback tomorrow morning with an appointment option, and or suggestions regarding a prescription. Please advise Patient at [phone], if any further questions.

## 2024-07-26 ENCOUNTER — TELEMEDICINE (OUTPATIENT)
Dept: FAMILY MEDICINE CLINIC | Facility: CLINIC | Age: 39
End: 2024-07-26
Payer: COMMERCIAL

## 2024-07-26 VITALS — TEMPERATURE: 98 F

## 2024-07-26 DIAGNOSIS — U07.1 COVID-19 VIRUS INFECTION: Primary | ICD-10-CM

## 2024-07-26 PROCEDURE — 99213 OFFICE O/P EST LOW 20 MIN: CPT | Performed by: PHYSICIAN ASSISTANT

## 2024-07-26 NOTE — PROGRESS NOTES
COVID-19 Outpatient Progress Note  Name: Grant Noguera      : 1985      MRN: [id number]  Encounter Provider: Jeny Aburto PA-C  Encounter Date: 2024   Encounter department: [facility]    Assessment & Plan   1. COVID-19 virus infection  Not sure what day patient is technically on but he has been having symptoms for at least 7 days and therefore not a candidate for an antiviral medication.  He states that his worst symptom is his head congestion so I recommend going to the pharmacy counter asking for generic Sudafed 12-hour or generic Mucinex D and take as directed.  Increase fluids and rest.  May also use Aleve as needed 1 to 2 tablets with food.  If symptoms worsen he needs an in person appointment.  Disposition:     Patient was advised that they can go back to your normal activities when, for at least 24 hours, both are true: their symptoms are getting better overall and they have not had a fever (and are not using fever-reducing medication).    When going back to your normal activities, take added precaution over the next 5 days, such as taking additional steps for  air, hygiene, masks, physical distancing, and/or testing when you will be around other people indoors. You may still be able to spread the virus that made you sick, even if you are feeling better.    If patient develops a fever or starts to feel worse after they have gone back to normal activities, stay home and away from others again until, for at least 24 hours, both are true: symptoms are improving overall and they have not had a fever (and are not using fever-reducing medication). Then take added precaution for the next 5 days.      Discussed symptom directed medication options with patient. Discussed vitamin D, vitamin C, and/or zinc supplementation with patient.     I have spent a total time of 15 minutes on the day of the encounter for this patient including discussing prognosis, instructions for  management, patient and family education, risk factor reductions, impressions, documenting in the medical record and obtaining or reviewing history.          Encounter provider: Jeny Aburto PA-C     Provider located at: [facility]  [address] [facility] [place] [id number] [phone]     Recent Visits  Date Type Provider Dept   07/25/24 Telephone KELLI Garg [facility]   Showing recent visits within past 7 days and meeting all other requirements  Today's Visits  Date Type Provider Dept   07/26/24 Telemedicine Jeny Aburto PA-C [facility]   Showing today's visits and meeting all other requirements  Future Appointments  No visits were found meeting these conditions.  Showing future appointments within next 150 days and meeting all other requirements    History of Present Illness          Verification of patient location:  Patient is located in the following state in which I hold an active license: [place]    Subjective:   Grant Noguera is a 39 y.o. male who has been screened for COVID-19. Symptom change since last report: worsening. Patient's symptoms include fatigue, malaise, nasal congestion, rhinorrhea, sore throat, anosmia, loss of taste, cough, myalgias and headache. Patient denies fever, chills, shortness of breath, chest tightness, abdominal pain, nausea, vomiting and diarrhea.     - Date of symptom onset: 7/19/2024  - Date of positive COVID-19 test: 7/24/2024. Type of test: Home antigen.     COVID-19 vaccination status: Not vaccinated        Staying home and isolating themselves?: has not  He is taking care to not share personal items and is cleaning all surfaces that are touched often, like counters, tabletops, and doorknobs using household cleaning sprays or wipes.     Wearing a mask when leaving room?: is not      Patient states that his wife was COVID-positive first and then all of his children he started having  his for symptoms roughly 7 to 10 days ago but worse in the past 3 days getting a lot of congestion in his sinuses body aches and feeling rundown.  He took an Aleve today but other than that he has not been taking any medications for his symptoms.  This is not his first COVID infection.  He states that he normally takes a strong Afrin nasal spray that he can only take for 3 days when he gets sick but he has not started this yet because he waits till the end when he gets bad because he can only use it for 3 days.  He also states that he stopped using over-the-counter decongestants because they do not work any more for him.  He states that his wife was seen and was given steroids and an inhaler and he was hoping that maybe steroids would help his head congestion.  During the visit he was in his car [place] with his children to get soup.    Lab Results   Component Value Date    SARSCOV2 Not Detected 06/12/2020    SARSCORONAVI Not Detected 11/06/2021       Review of Systems   Constitutional:  Positive for fatigue. Negative for chills and fever.   HENT:  Positive for congestion, rhinorrhea and sore throat.    Respiratory:  Positive for cough. Negative for chest tightness and shortness of breath.    Gastrointestinal:  Negative for abdominal pain, diarrhea, nausea and vomiting.   Musculoskeletal:  Positive for myalgias.   Neurological:  Positive for headaches.     Objective     Temp 98 °F (36.7 °C) (Tympanic)     Physical Exam

## 2024-09-17 ENCOUNTER — OFFICE VISIT (OUTPATIENT)
Dept: FAMILY MEDICINE CLINIC | Facility: CLINIC | Age: 39
End: 2024-09-17
Payer: COMMERCIAL

## 2024-09-17 VITALS
HEIGHT: 72 IN | DIASTOLIC BLOOD PRESSURE: 80 MMHG | BODY MASS INDEX: 25.9 KG/M2 | SYSTOLIC BLOOD PRESSURE: 120 MMHG | TEMPERATURE: 96.9 F | WEIGHT: 191.2 LBS | HEART RATE: 57 BPM | RESPIRATION RATE: 16 BRPM

## 2024-09-17 DIAGNOSIS — E55.9 VITAMIN D DEFICIENCY: ICD-10-CM

## 2024-09-17 DIAGNOSIS — Z13.1 SCREENING FOR DIABETES MELLITUS: ICD-10-CM

## 2024-09-17 DIAGNOSIS — Z80.42 FAMILY HISTORY OF PROSTATE CANCER IN FATHER: ICD-10-CM

## 2024-09-17 DIAGNOSIS — Z13.0 SCREENING FOR DEFICIENCY ANEMIA: ICD-10-CM

## 2024-09-17 DIAGNOSIS — C05.0 MUCOEPIDERMOID CARCINOMA OF HARD PALATE (HCC): ICD-10-CM

## 2024-09-17 DIAGNOSIS — R79.89 LOW TESTOSTERONE: ICD-10-CM

## 2024-09-17 DIAGNOSIS — E78.5 DYSLIPIDEMIA: ICD-10-CM

## 2024-09-17 DIAGNOSIS — S66.912A STRAIN OF LEFT HAND, INITIAL ENCOUNTER: Primary | ICD-10-CM

## 2024-09-17 PROBLEM — R73.01 IFG (IMPAIRED FASTING GLUCOSE): Status: RESOLVED | Noted: 2023-03-22 | Resolved: 2024-09-17

## 2024-09-17 PROCEDURE — 99214 OFFICE O/P EST MOD 30 MIN: CPT | Performed by: NURSE PRACTITIONER

## 2024-09-17 NOTE — PROGRESS NOTES
Adult Annual Physical  Name: Grant Noguera      : 1985      MRN: [id number]  Encounter Provider: KELLI Garg  Encounter Date: 2024   Encounter department: [facility]    Assessment & Plan    Immunizations and preventive care screenings were discussed with patient today. Appropriate education was printed on patient's after visit summary.    Counseling:  {Annual Physical; Counselin}         History of Present Illness   {Disappearing Hyperlinks I Encounters * My Last Note * Since Last Visit * History :54271}  Adult Annual Physical  Review of Systems   Constitutional:  Negative for appetite change and fever.   Respiratory:  Negative for chest tightness and shortness of breath.    Cardiovascular:  Negative for chest pain, palpitations and leg swelling.   Gastrointestinal:  Negative for abdominal pain.   Genitourinary:  Negative for difficulty urinating.   Musculoskeletal:  Negative for arthralgias and myalgias.        Left hand pain   Skin:  Negative for wound.   Neurological:  Negative for dizziness, light-headedness and headaches.   Psychiatric/Behavioral:  Negative for dysphoric mood and sleep disturbance. The patient is not nervous/anxious.      {Select to Display PM (Optional):10601}    Objective   {Disappearing Hyperlinks   Review Vitals * Enter New Vitals * Results Review * Labs * Imaging * Cardiology * Procedures * Lung Cancer Screening * Surgical eConsent :99097}  /80 (BP Location: Left arm, Patient Position: Sitting, Cuff Size: Adult)   Pulse 57   Temp (!) 96.9 °F (36.1 °C) (Temporal)   Resp 16   Ht 6' (1.829 m)   Wt 86.7 kg (191 lb 3.2 oz)   BMI 25.93 kg/m²     Physical Exam  {Administrative / Billing Section (Optional):22202}

## 2024-09-17 NOTE — ASSESSMENT & PLAN NOTE
Patient symptoms are consistent with muscle strain of the left hand.  Patient was advised to continue to ice the area 20 minutes on and then taking a break as needed.

## 2024-09-17 NOTE — ASSESSMENT & PLAN NOTE
Repeat lipid panel was ordered to assess the status of this.  Patient was advised to continue to limit fatty and fried foods in his diet.  Orders:    Lipid panel; Future

## 2024-09-17 NOTE — PROGRESS NOTES
Ambulatory Visit  Name: Grant Noguera      : 1985      MRN: [id number]  Encounter Provider: KELLI Garg  Encounter Date: 2024   Encounter department: [facility]    Assessment & Plan  Strain of left hand, initial encounter  Patient symptoms are consistent with muscle strain of the left hand.  Patient was advised to continue to ice the area 20 minutes on and then taking a break as needed.         Dyslipidemia  Repeat lipid panel was ordered to assess the status of this.  Patient was advised to continue to limit fatty and fried foods in his diet.  Orders:    Lipid panel; Future    Vitamin D deficiency  Repeat vitamin D level was ordered to assess the status of this  Orders:    Vitamin D 25 hydroxy; Future    Screening for deficiency anemia    Orders:    CBC and differential; Future    Screening for diabetes mellitus    Orders:    Comprehensive metabolic panel; Future    UA w Reflex to Microscopic w Reflex to Culture; Future    Hemoglobin A1C; Future    Family history of prostate cancer in father  Annual PSA level was ordered to be completed.  Orders:    PSA, total and free; Future    Low testosterone  Repeat testosterone level was ordered to assess the status of this.  Orders:    Testosterone, free, total; Future    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HCC)  Patient continues to follow with ENT specialist regarding this.           Depression Screening and Follow-up Plan: Patient was screened for depression during today's encounter. They screened negative with a PHQ-2 score of 0.      History of Present Illness     Left hand pain: The patient reports that he began to experience pain in his left hand last night while at work.  He reports that the began began after he was unscrewing something for extended period of time.  He reports that the pain was present in the area between his first and second fingers.  He did have some swelling in the area initially but this has  since resolved.  He also reports that the pain has improved significantly but he does still notice some pain when moving these fingers.    Carcinoma of hard palate: Patient continues to follow with ENT specialist regarding this.  Per ENT's most recent note he is currently under surveillance for this after prior excision.  The patient denies any current symptoms related to this.    Dyslipidemia: Patient's most recent lipid panel showed slightly elevated total cholesterol and LDL.  Patient is currently taking a daily fish oil supplement.    Vitamin D deficiency: Patient's most recent vitamin D level was slightly low.  Patient is currently taking a daily vitamin D supplement.    Low testosterone: Patient was noted to have a slightly low testosterone level in the past however, his most recent level was noted to be normal.  Patient would like his testosterone level reassessed at this time.          Review of Systems   Constitutional:  Negative for chills and fever.   HENT:  Negative for ear pain and sore throat.    Eyes:  Negative for pain and visual disturbance.   Respiratory:  Negative for cough, chest tightness, shortness of breath and wheezing.    Cardiovascular:  Negative for chest pain, palpitations and leg swelling.   Gastrointestinal:  Negative for abdominal pain, constipation, diarrhea, nausea and vomiting.   Endocrine: Negative for cold intolerance and heat intolerance.   Genitourinary:  Negative for decreased urine volume, dysuria and hematuria.   Musculoskeletal:  Positive for arthralgias (left hand). Negative for back pain, joint swelling and myalgias.   Skin:  Negative for color change and rash.   Allergic/Immunologic: Negative for environmental allergies.   Neurological:  Negative for dizziness, seizures, syncope, weakness, light-headedness, numbness and headaches.   Hematological:  Negative for adenopathy.   Psychiatric/Behavioral:  Negative for confusion. The patient is not nervous/anxious.    All other  systems reviewed and are negative.          Objective     /80 (BP Location: Left arm, Patient Position: Sitting, Cuff Size: Adult)   Pulse 57   Temp (!) 96.9 °F (36.1 °C) (Temporal)   Resp 16   Ht 6' (1.829 m)   Wt 86.7 kg (191 lb 3.2 oz)   BMI 25.93 kg/m²     Physical Exam  Vitals and nursing note reviewed.   Constitutional:       General: He is not in acute distress.     Appearance: Normal appearance. He is well-developed. He is not ill-appearing.   HENT:      Head: Normocephalic.   Eyes:      Conjunctiva/sclera: Conjunctivae normal.   Cardiovascular:      Rate and Rhythm: Normal rate and regular rhythm.      Pulses: Normal pulses.           Carotid pulses are 2+ on the right side and 2+ on the left side.       Radial pulses are 2+ on the right side and 2+ on the left side.        Posterior tibial pulses are 2+ on the right side and 2+ on the left side.      Heart sounds: Normal heart sounds. No murmur heard.  Pulmonary:      Effort: Pulmonary effort is normal. No respiratory distress.      Breath sounds: Normal breath sounds. No decreased breath sounds, wheezing, rhonchi or rales.   Abdominal:      General: Abdomen is flat. Bowel sounds are normal. There is no distension.      Palpations: Abdomen is soft.      Tenderness: There is no abdominal tenderness. There is no guarding.   Musculoskeletal:         General: No swelling. Normal range of motion.      Left hand: No swelling, deformity, tenderness or bony tenderness. Normal range of motion. Normal strength. Normal sensation.      Cervical back: Normal range of motion and neck supple.      Right lower leg: No edema.      Left lower leg: No edema.      Comments: No abnormalities noted in the left hand.  Patient did have a full range of motion of the hand but did report some pain between the first and second fingers with movement.   Skin:     General: Skin is warm and dry.      Capillary Refill: Capillary refill takes less than 2 seconds.    Neurological:      General: No focal deficit present.      Mental Status: He is alert and oriented to person, place, and time.   Psychiatric:         Mood and Affect: Mood normal.         Behavior: Behavior normal.         Thought Content: Thought content normal.         Judgment: Judgment normal.

## 2024-09-17 NOTE — ASSESSMENT & PLAN NOTE
Repeat testosterone level was ordered to assess the status of this.  Orders:    Testosterone, free, total; Future

## 2024-09-17 NOTE — ASSESSMENT & PLAN NOTE
Repeat vitamin D level was ordered to assess the status of this  Orders:    Vitamin D 25 hydroxy; Future

## 2024-09-20 LAB
25(OH)D3+25(OH)D2 SERPL-MCNC: 52 NG/ML (ref 30–100)
ALBUMIN SERPL-MCNC: 4.8 G/DL (ref 3.5–5.7)
ALP SERPL-CCNC: 45 U/L (ref 35–120)
ALT SERPL-CCNC: 21 U/L
ANION GAP SERPL CALCULATED.3IONS-SCNC: 9 MMOL/L (ref 3–11)
AST SERPL-CCNC: 15 U/L
BASOPHILS # BLD AUTO: 0 THOU/CMM (ref 0–0.1)
BASOPHILS NFR BLD AUTO: 1 %
BILIRUB SERPL-MCNC: 1.1 MG/DL (ref 0.2–1)
BUN SERPL-MCNC: 15 MG/DL (ref 7–28)
CALCIUM SERPL-MCNC: 9.7 MG/DL (ref 8.5–10.1)
CHLORIDE SERPL-SCNC: 102 MMOL/L (ref 100–109)
CHOLEST SERPL-MCNC: 183 MG/DL
CHOLEST/HDLC SERPL: 4 {RATIO}
CO2 SERPL-SCNC: 30 MMOL/L (ref 21–31)
CREAT SERPL-MCNC: 1.09 MG/DL (ref 0.53–1.3)
CYTOLOGY CMNT CVX/VAG CYTO-IMP: ABNORMAL
DIFFERENTIAL METHOD BLD: NORMAL
EOSINOPHIL # BLD AUTO: 0 THOU/CMM (ref 0–0.5)
EOSINOPHIL NFR BLD AUTO: 1 %
ERYTHROCYTE [DISTWIDTH] IN BLOOD BY AUTOMATED COUNT: 12.6 % (ref 12–16)
EST. AVERAGE GLUCOSE BLD GHB EST-MCNC: 111 MG/DL
GFR/BSA.PRED SERPLBLD CYS-BASED-ARV: 88 ML/MIN/{1.73_M2}
GLUCOSE SERPL-MCNC: 84 MG/DL (ref 65–99)
GLUCOSE UR QL STRIP: NEGATIVE MG/DL
HBA1C MFR BLD: 5.5 %
HCT VFR BLD AUTO: 44 % (ref 37–48)
HDLC SERPL-MCNC: 46 MG/DL (ref 23–92)
HGB BLD-MCNC: 14.9 G/DL (ref 12.5–17)
HGB UR QL STRIP: NEGATIVE MG/DL
KETONES UR QL STRIP: NEGATIVE MG/DL
LDLC SERPL CALC-MCNC: 122 MG/DL
LEUKOCYTE ESTERASE UR QL STRIP: NEGATIVE /UL
LYMPHOCYTES # BLD AUTO: 1.4 THOU/CMM (ref 1–3)
LYMPHOCYTES NFR BLD AUTO: 31 %
MCH RBC QN AUTO: 30 PG (ref 27–36)
MCHC RBC AUTO-ENTMCNC: 33.9 G/DL (ref 32–37)
MCV RBC AUTO: 88 FL (ref 80–100)
MONOCYTES # BLD AUTO: 0.3 THOU/CMM (ref 0.3–1)
MONOCYTES NFR BLD AUTO: 6 %
NEUTROPHILS # BLD AUTO: 2.8 THOU/CMM (ref 1.8–7.8)
NEUTROPHILS NFR BLD AUTO: 61 %
NITRITE UR QL STRIP: NEGATIVE
NONHDLC SERPL-MCNC: 137 MG/DL
PH UR: 6 [PH] (ref 4.5–8)
PLATELET # BLD AUTO: 180 THOU/CMM (ref 140–350)
PMV BLD REES-ECKER: 9.8 FL (ref 7.5–11.3)
POTASSIUM SERPL-SCNC: 4.5 MMOL/L (ref 3.5–5.2)
PROT 24H UR-MRATE: NEGATIVE MG/DL
PROT SERPL-MCNC: 6.9 G/DL (ref 6.3–8.3)
RBC # BLD AUTO: 4.99 MILL/CMM (ref 4–5.4)
SHBG SERPL-SCNC: 21.5 NMOL/L (ref 13.3–89.5)
SL AMB % FREE PSA: 33 %
SL AMB POCT URINE COMMENT: NORMAL
SL AMB PSA, FREE: 0.22 NG/ML
SL AMB PSA, TOTAL: 0.66 NG/ML
SODIUM SERPL-SCNC: 141 MMOL/L (ref 135–145)
SP GR UR: 1.01 (ref 1–1.03)
TESTOST FREE MFR SERPL: 2.4 % (ref 1.5–3.2)
TESTOST FREE SERPL-MCNC: 57.2 PG/ML (ref 21–135)
TESTOST SERPL-MCNC: 237 NG/DL (ref 198–679)
TESTOSTERONE.FREE+WB MFR SERPL: 56.6 %
TESTOSTERONE.FREE+WB SERPL-MCNC: 134 NG/DL (ref 48–317)
TRIGL SERPL-MCNC: 77 MG/DL
WBC # BLD AUTO: 4.6 THOU/CMM (ref 4–10.5)

## 2024-09-21 LAB — LEGIONELLA SPEC CULT: NORMAL

## 2024-09-23 ENCOUNTER — TELEPHONE (OUTPATIENT)
Dept: FAMILY MEDICINE CLINIC | Facility: CLINIC | Age: 39
End: 2024-09-23

## 2024-12-31 DIAGNOSIS — J30.89 NON-SEASONAL ALLERGIC RHINITIS, UNSPECIFIED TRIGGER: ICD-10-CM

## 2024-12-31 RX ORDER — FLUTICASONE PROPIONATE 50 MCG
SPRAY, SUSPENSION (ML) NASAL
Qty: 48 ML | Refills: 1 | Status: SHIPPED | OUTPATIENT
Start: 2024-12-31

## 2025-06-05 ENCOUNTER — OFFICE VISIT (OUTPATIENT)
Dept: FAMILY MEDICINE CLINIC | Facility: CLINIC | Age: 40
End: 2025-06-05
Payer: COMMERCIAL

## 2025-06-05 VITALS
HEIGHT: 72 IN | WEIGHT: 197 LBS | OXYGEN SATURATION: 98 % | SYSTOLIC BLOOD PRESSURE: 126 MMHG | BODY MASS INDEX: 26.68 KG/M2 | HEART RATE: 69 BPM | DIASTOLIC BLOOD PRESSURE: 82 MMHG

## 2025-06-05 DIAGNOSIS — H65.03 NON-RECURRENT ACUTE SEROUS OTITIS MEDIA OF BOTH EARS: ICD-10-CM

## 2025-06-05 DIAGNOSIS — H10.33 ACUTE CONJUNCTIVITIS OF BOTH EYES, UNSPECIFIED ACUTE CONJUNCTIVITIS TYPE: ICD-10-CM

## 2025-06-05 DIAGNOSIS — J30.89 NON-SEASONAL ALLERGIC RHINITIS, UNSPECIFIED TRIGGER: Primary | ICD-10-CM

## 2025-06-05 PROCEDURE — 99214 OFFICE O/P EST MOD 30 MIN: CPT | Performed by: FAMILY MEDICINE

## 2025-06-05 RX ORDER — TOBRAMYCIN AND DEXAMETHASONE 3; 1 MG/ML; MG/ML
1 SUSPENSION/ DROPS OPHTHALMIC
Qty: 5 ML | Refills: 0 | Status: SHIPPED | OUTPATIENT
Start: 2025-06-05 | End: 2025-06-13

## 2025-06-05 RX ORDER — PREDNISONE 20 MG/1
TABLET ORAL
COMMUNITY
Start: 2025-06-04 | End: 2025-06-13

## 2025-06-05 NOTE — ASSESSMENT & PLAN NOTE
Orders:  •  tobramycin-dexamethasone (TOBRADEX) ophthalmic suspension; Administer 1 drop to both eyes every 4 (four) hours while awake

## 2025-06-05 NOTE — PROGRESS NOTES
Name: Grant Noguera      : 1985      MRN: [id number]  Encounter Provider: Mark Pereyra MD  Encounter Date: 2025   Encounter department: [facility]  :  Assessment & Plan  Non-seasonal allergic rhinitis, unspecified trigger    Orders:  •  tobramycin-dexamethasone (TOBRADEX) ophthalmic suspension; Administer 1 drop to both eyes every 4 (four) hours while awake    Acute conjunctivitis of both eyes, unspecified acute conjunctivitis type         Non-recurrent acute serous otitis media of both ears               1. Non-seasonal allergic rhinitis, unspecified trigger  Assessment & Plan:    Orders:  •  tobramycin-dexamethasone (TOBRADEX) ophthalmic suspension; Administer 1 drop to both eyes every 4 (four) hours while awake    Orders:  -     tobramycin-dexamethasone (TOBRADEX) ophthalmic suspension; Administer 1 drop to both eyes every 4 (four) hours while awake  2. Acute conjunctivitis of both eyes, unspecified acute conjunctivitis type  Comments:  Some aspect of allergies versus infection.  TobraDex should cover both.  3. Non-recurrent acute serous otitis media of both ears  Comments:  Otitis media diagnosed recently, amoxicillin and prednisone.  On exam today, I concur, and agree with the treatment.      Chief Complaint   Patient presents with   • Conjunctivitis     Bilateral eye crusty,cloudy. Started today            History of Present Illness   Patient is here today because he has irritation of the eyes.  Woke up with his eyes stuck shut.  Could barely open his eyes this morning, as well as some visual changes with that.  His kids recently had pinkeye as well.    Also, last night he went to urgent care for otitis media.  Sick for about the last week or so.          Review of Systems   Constitutional: Negative.    HENT:  Positive for congestion and ear pain.    Eyes:  Positive for discharge, redness, itching and visual disturbance.   Respiratory:  Positive for cough.         Objective   /82 (BP Location: Left arm, Patient Position: Sitting, Cuff Size: Standard)   Pulse 69   Ht 6' (1.829 m)   Wt 89.4 kg (197 lb)   SpO2 98%   BMI 26.72 kg/m²      Physical Exam  Vitals and nursing note reviewed.   Constitutional:       Appearance: Normal appearance.   HENT:      Right Ear: Hearing, ear canal and external ear normal. Tympanic membrane is erythematous and bulging.      Left Ear: Hearing, ear canal and external ear normal. Tympanic membrane is erythematous.     Eyes:      General: Lids are normal. Vision grossly intact. Gaze aligned appropriately.      Extraocular Movements: Extraocular movements intact.      Conjunctiva/sclera:      Right eye: Right conjunctiva is injected.      Left eye: Left conjunctiva is injected.       Neurological:      Mental Status: He is alert.

## 2025-06-13 ENCOUNTER — OFFICE VISIT (OUTPATIENT)
Dept: FAMILY MEDICINE CLINIC | Facility: CLINIC | Age: 40
End: 2025-06-13
Payer: COMMERCIAL

## 2025-06-13 VITALS
WEIGHT: 196 LBS | RESPIRATION RATE: 18 BRPM | DIASTOLIC BLOOD PRESSURE: 70 MMHG | HEART RATE: 96 BPM | TEMPERATURE: 98.2 F | SYSTOLIC BLOOD PRESSURE: 128 MMHG | HEIGHT: 72 IN | OXYGEN SATURATION: 97 % | BODY MASS INDEX: 26.55 KG/M2

## 2025-06-13 DIAGNOSIS — J40 BRONCHITIS: Primary | ICD-10-CM

## 2025-06-13 DIAGNOSIS — J30.89 NON-SEASONAL ALLERGIC RHINITIS, UNSPECIFIED TRIGGER: ICD-10-CM

## 2025-06-13 DIAGNOSIS — Z20.822 SUSPECTED COVID-19 VIRUS INFECTION: ICD-10-CM

## 2025-06-13 LAB
SARS-COV-2 AG UPPER RESP QL IA: NEGATIVE
VALID CONTROL: NORMAL

## 2025-06-13 PROCEDURE — 87811 SARS-COV-2 COVID19 W/OPTIC: CPT | Performed by: NURSE PRACTITIONER

## 2025-06-13 PROCEDURE — 99214 OFFICE O/P EST MOD 30 MIN: CPT | Performed by: NURSE PRACTITIONER

## 2025-06-13 RX ORDER — ALBUTEROL SULFATE 90 UG/1
2 INHALANT RESPIRATORY (INHALATION) EVERY 6 HOURS PRN
Qty: 8 G | Refills: 0 | Status: SHIPPED | OUTPATIENT
Start: 2025-06-13

## 2025-06-13 RX ORDER — BENZONATATE 200 MG/1
200 CAPSULE ORAL 3 TIMES DAILY PRN
Qty: 20 CAPSULE | Refills: 0 | Status: SHIPPED | OUTPATIENT
Start: 2025-06-13

## 2025-06-13 RX ORDER — CIPROFLOXACIN 500 MG/1
500 TABLET, FILM COATED ORAL EVERY 24 HOURS
Qty: 7 TABLET | Refills: 0 | Status: SHIPPED | OUTPATIENT
Start: 2025-06-13 | End: 2025-06-20

## 2025-06-13 NOTE — PROGRESS NOTES
Name: Grant Noguera      : 1985      MRN: [id number]  Encounter Provider: KELLI Garg  Encounter Date: 2025   Encounter department: Counts include 234 beds at the [facility]  :  Assessment & Plan  Bronchitis  7-day course of ciprofloxacin was ordered for treatment of acute bronchitis.  Albuterol inhaler was ordered to be used as needed for shortness of breath.  Tessalon Perles were also ordered to be used as needed for cough.  Orders:  •  ciprofloxacin (CIPRO) 500 mg tablet; Take 1 tablet (500 mg total) by mouth every 24 hours for 7 days  •  albuterol (PROVENTIL HFA,VENTOLIN HFA) 90 mcg/act inhaler; Inhale 2 puffs every 6 (six) hours as needed for wheezing or shortness of breath  •  benzonatate (TESSALON) 200 MG capsule; Take 1 capsule (200 mg total) by mouth 3 (three) times a day as needed for cough    Suspected COVID-19 virus infection    Orders:  •  POCT Rapid Covid Ag    Non-seasonal allergic rhinitis, unspecified trigger  Patient was advised to continue Flonase as directed to help with congestion.             Depression Screening and Follow-up Plan: Patient was screened for depression during today's encounter. They screened negative with a PHQ-2 score of 0.        History of Present Illness   Bronchitis: Patient reports that over the past 24 hours he has been experiencing symptoms of wheezing, shortness of breath, productive cough, myalgias, rhinorrhea, nasal congestion, sinus pressure and congestion, and PND.  Patient denies fever or chills.  Rapid COVID test completed in the office today was negative.    Allergic rhinitis: Well-controlled on current regimen.      Review of Systems   Constitutional:  Positive for fatigue. Negative for chills and fever.   HENT:  Positive for congestion, postnasal drip, rhinorrhea, sinus pressure, sinus pain and sore throat. Negative for ear pain.    Eyes:  Negative for pain and visual disturbance.   Respiratory:  Positive for cough (productive), chest  tightness, shortness of breath and wheezing.    Cardiovascular:  Negative for chest pain and palpitations.   Gastrointestinal:  Negative for abdominal pain, constipation, diarrhea, nausea and vomiting.   Endocrine: Negative for cold intolerance and heat intolerance.   Genitourinary:  Negative for decreased urine volume, dysuria and hematuria.   Musculoskeletal:  Positive for myalgias. Negative for arthralgias and back pain.   Skin:  Negative for color change and rash.   Allergic/Immunologic: Positive for environmental allergies.   Neurological:  Negative for dizziness, seizures, syncope, weakness, light-headedness, numbness and headaches.   Hematological:  Negative for adenopathy.   Psychiatric/Behavioral:  Negative for confusion. The patient is not nervous/anxious.    All other systems reviewed and are negative.      Objective   /70 (BP Location: Right arm, Patient Position: Sitting, Cuff Size: Adult)   Pulse 96   Temp 98.2 °F (36.8 °C) (Tympanic)   Resp 18   Ht 6' (1.829 m)   Wt 88.9 kg (196 lb)   SpO2 97%   BMI 26.58 kg/m²      Physical Exam  Vitals and nursing note reviewed.   Constitutional:       General: He is not in acute distress.     Appearance: Normal appearance. He is well-developed. He is not ill-appearing.   HENT:      Head: Normocephalic.      Right Ear: Hearing normal. A middle ear effusion (small) is present.      Left Ear: Hearing normal. A middle ear effusion (small) is present.      Mouth/Throat:      Pharynx: Postnasal drip present. No pharyngeal swelling, oropharyngeal exudate, posterior oropharyngeal erythema or uvula swelling.      Tonsils: No tonsillar exudate or tonsillar abscesses.     Eyes:      Conjunctiva/sclera: Conjunctivae normal.       Cardiovascular:      Rate and Rhythm: Normal rate and regular rhythm.      Pulses: Normal pulses.           Carotid pulses are 2+ on the right side and 2+ on the left side.       Radial pulses are 2+ on the right side and 2+ on the left  side.        Posterior tibial pulses are 2+ on the right side and 2+ on the left side.      Heart sounds: Normal heart sounds. No murmur heard.  Pulmonary:      Effort: Pulmonary effort is normal. No respiratory distress.      Breath sounds: Examination of the right-lower field reveals wheezing. Examination of the left-lower field reveals wheezing. Wheezing (expiratory) present. No decreased breath sounds, rhonchi or rales.   Abdominal:      General: Abdomen is flat. There is no distension.      Palpations: Abdomen is soft.      Tenderness: There is no abdominal tenderness. There is no guarding.     Musculoskeletal:         General: No swelling. Normal range of motion.      Cervical back: Normal range of motion and neck supple.      Right lower leg: No edema.      Left lower leg: No edema.     Skin:     General: Skin is warm and dry.      Capillary Refill: Capillary refill takes less than 2 seconds.     Neurological:      General: No focal deficit present.      Mental Status: He is alert and oriented to person, place, and time.     Psychiatric:         Mood and Affect: Mood normal.         Behavior: Behavior normal.         Thought Content: Thought content normal.         Judgment: Judgment normal.

## 2025-06-13 NOTE — ASSESSMENT & PLAN NOTE
7-day course of ciprofloxacin was ordered for treatment of acute bronchitis.  Albuterol inhaler was ordered to be used as needed for shortness of breath.  Tessalon Perles were also ordered to be used as needed for cough.  Orders:  •  ciprofloxacin (CIPRO) 500 mg tablet; Take 1 tablet (500 mg total) by mouth every 24 hours for 7 days  •  albuterol (PROVENTIL HFA,VENTOLIN HFA) 90 mcg/act inhaler; Inhale 2 puffs every 6 (six) hours as needed for wheezing or shortness of breath  •  benzonatate (TESSALON) 200 MG capsule; Take 1 capsule (200 mg total) by mouth 3 (three) times a day as needed for cough

## 2025-06-28 DIAGNOSIS — J30.89 NON-SEASONAL ALLERGIC RHINITIS, UNSPECIFIED TRIGGER: ICD-10-CM

## 2025-06-30 RX ORDER — FLUTICASONE PROPIONATE 50 MCG
SPRAY, SUSPENSION (ML) NASAL
Qty: 48 ML | Refills: 1 | Status: SHIPPED | OUTPATIENT
Start: 2025-06-30

## 2025-07-05 DIAGNOSIS — J40 BRONCHITIS: ICD-10-CM

## 2025-07-07 RX ORDER — ALBUTEROL SULFATE 90 UG/1
INHALANT RESPIRATORY (INHALATION)
Qty: 6.7 G | Refills: 1 | Status: SHIPPED | OUTPATIENT
Start: 2025-07-07

## (undated) DEVICE — UTILITY MARKER,BLACK WITH LABELS: Brand: DEVON

## (undated) DEVICE — ELECTRODE NEEDLE MOD E-Z CLEAN 2.75IN 7CM -0013M

## (undated) DEVICE — SPONGE LAP 18 X 4 IN

## (undated) DEVICE — INTENDED FOR TISSUE SEPARATION, AND OTHER PROCEDURES THAT REQUIRE A SHARP SURGICAL BLADE TO PUNCTURE OR CUT.: Brand: BARD-PARKER ® CARBON RIB-BACK BLADES

## (undated) DEVICE — LIGHT HANDLE COVER SLEEVE DISP BLUE STELLAR

## (undated) DEVICE — VIAL DECANTER

## (undated) DEVICE — SPONGE LAP 18 X 4 IN STRL RFD

## (undated) DEVICE — NEEDLE 25G X 1 1/2

## (undated) DEVICE — PLUMEPEN PRO 10FT

## (undated) DEVICE — GLOVE SRG BIOGEL ORTHOPEDIC 7

## (undated) DEVICE — BIPOLAR CORD DISP

## (undated) DEVICE — STERILE BETHLEHEM T AND A PACK: Brand: CARDINAL HEALTH

## (undated) DEVICE — PAD GROUNDING ADULT

## (undated) DEVICE — NEEDLE 27 G X 1 1/4